# Patient Record
Sex: MALE | Race: WHITE | NOT HISPANIC OR LATINO | Employment: FULL TIME | URBAN - METROPOLITAN AREA
[De-identification: names, ages, dates, MRNs, and addresses within clinical notes are randomized per-mention and may not be internally consistent; named-entity substitution may affect disease eponyms.]

---

## 2024-02-01 ENCOUNTER — OFFICE VISIT (OUTPATIENT)
Dept: OTOLARYNGOLOGY | Facility: CLINIC | Age: 39
End: 2024-02-01
Payer: COMMERCIAL

## 2024-02-01 ENCOUNTER — APPOINTMENT (OUTPATIENT)
Dept: CARDIOLOGY | Facility: HOSPITAL | Age: 39
End: 2024-02-01
Payer: COMMERCIAL

## 2024-02-01 ENCOUNTER — APPOINTMENT (OUTPATIENT)
Dept: RADIOLOGY | Facility: HOSPITAL | Age: 39
End: 2024-02-01
Payer: COMMERCIAL

## 2024-02-01 ENCOUNTER — HOSPITAL ENCOUNTER (EMERGENCY)
Facility: HOSPITAL | Age: 39
Discharge: HOME | End: 2024-02-01
Attending: EMERGENCY MEDICINE
Payer: COMMERCIAL

## 2024-02-01 VITALS
HEART RATE: 90 BPM | HEIGHT: 71 IN | RESPIRATION RATE: 18 BRPM | TEMPERATURE: 97.3 F | WEIGHT: 290 LBS | SYSTOLIC BLOOD PRESSURE: 178 MMHG | DIASTOLIC BLOOD PRESSURE: 114 MMHG | BODY MASS INDEX: 40.6 KG/M2 | OXYGEN SATURATION: 98 %

## 2024-02-01 VITALS
TEMPERATURE: 97.3 F | SYSTOLIC BLOOD PRESSURE: 177 MMHG | WEIGHT: 293.8 LBS | HEIGHT: 71 IN | DIASTOLIC BLOOD PRESSURE: 127 MMHG | BODY MASS INDEX: 41.13 KG/M2

## 2024-02-01 DIAGNOSIS — R09.82 POSTNASAL DRIP: ICD-10-CM

## 2024-02-01 DIAGNOSIS — K21.9 LARYNGOPHARYNGEAL REFLUX (LPR): ICD-10-CM

## 2024-02-01 DIAGNOSIS — I10 HYPERTENSION, UNSPECIFIED TYPE: Primary | ICD-10-CM

## 2024-02-01 DIAGNOSIS — R49.0 HOARSENESS: Primary | ICD-10-CM

## 2024-02-01 DIAGNOSIS — J30.9 CHRONIC ALLERGIC RHINITIS: ICD-10-CM

## 2024-02-01 LAB
ALBUMIN SERPL BCP-MCNC: 4.8 G/DL (ref 3.4–5)
ALP SERPL-CCNC: 60 U/L (ref 33–120)
ALT SERPL W P-5'-P-CCNC: 72 U/L (ref 10–52)
ANION GAP SERPL CALC-SCNC: 12 MMOL/L (ref 10–20)
APPEARANCE UR: ABNORMAL
AST SERPL W P-5'-P-CCNC: 50 U/L (ref 9–39)
BASOPHILS # BLD AUTO: 0.07 X10*3/UL (ref 0–0.1)
BASOPHILS NFR BLD AUTO: 0.5 %
BILIRUB SERPL-MCNC: 0.3 MG/DL (ref 0–1.2)
BILIRUB UR STRIP.AUTO-MCNC: NEGATIVE MG/DL
BNP SERPL-MCNC: 10 PG/ML (ref 0–99)
BUN SERPL-MCNC: 19 MG/DL (ref 6–23)
CALCIUM SERPL-MCNC: 9.7 MG/DL (ref 8.6–10.3)
CARDIAC TROPONIN I PNL SERPL HS: 7 NG/L (ref 0–20)
CHLORIDE SERPL-SCNC: 102 MMOL/L (ref 98–107)
CO2 SERPL-SCNC: 28 MMOL/L (ref 21–32)
COLOR UR: YELLOW
CREAT SERPL-MCNC: 0.88 MG/DL (ref 0.5–1.3)
EGFRCR SERPLBLD CKD-EPI 2021: >90 ML/MIN/1.73M*2
EOSINOPHIL # BLD AUTO: 0.17 X10*3/UL (ref 0–0.7)
EOSINOPHIL NFR BLD AUTO: 1.3 %
ERYTHROCYTE [DISTWIDTH] IN BLOOD BY AUTOMATED COUNT: 12.9 % (ref 11.5–14.5)
GLUCOSE SERPL-MCNC: 90 MG/DL (ref 74–99)
GLUCOSE UR STRIP.AUTO-MCNC: NEGATIVE MG/DL
HCT VFR BLD AUTO: 49.9 % (ref 41–52)
HGB BLD-MCNC: 16.9 G/DL (ref 13.5–17.5)
HYALINE CASTS #/AREA URNS AUTO: ABNORMAL /LPF
IMM GRANULOCYTES # BLD AUTO: 0.04 X10*3/UL (ref 0–0.7)
IMM GRANULOCYTES NFR BLD AUTO: 0.3 % (ref 0–0.9)
KETONES UR STRIP.AUTO-MCNC: NEGATIVE MG/DL
LEUKOCYTE ESTERASE UR QL STRIP.AUTO: NEGATIVE
LYMPHOCYTES # BLD AUTO: 4.07 X10*3/UL (ref 1.2–4.8)
LYMPHOCYTES NFR BLD AUTO: 30.6 %
MAGNESIUM SERPL-MCNC: 2.17 MG/DL (ref 1.6–2.4)
MCH RBC QN AUTO: 29.6 PG (ref 26–34)
MCHC RBC AUTO-ENTMCNC: 33.9 G/DL (ref 32–36)
MCV RBC AUTO: 87 FL (ref 80–100)
MONOCYTES # BLD AUTO: 0.91 X10*3/UL (ref 0.1–1)
MONOCYTES NFR BLD AUTO: 6.8 %
NEUTROPHILS # BLD AUTO: 8.05 X10*3/UL (ref 1.2–7.7)
NEUTROPHILS NFR BLD AUTO: 60.5 %
NITRITE UR QL STRIP.AUTO: NEGATIVE
NRBC BLD-RTO: 0 /100 WBCS (ref 0–0)
PH UR STRIP.AUTO: 5 [PH]
PHOSPHATE SERPL-MCNC: 4.1 MG/DL (ref 2.5–4.9)
PLATELET # BLD AUTO: 327 X10*3/UL (ref 150–450)
POTASSIUM SERPL-SCNC: 4.3 MMOL/L (ref 3.5–5.3)
PROT SERPL-MCNC: 7.5 G/DL (ref 6.4–8.2)
PROT UR STRIP.AUTO-MCNC: ABNORMAL MG/DL
RBC # BLD AUTO: 5.71 X10*6/UL (ref 4.5–5.9)
RBC # UR STRIP.AUTO: NEGATIVE /UL
RBC #/AREA URNS AUTO: ABNORMAL /HPF
SODIUM SERPL-SCNC: 138 MMOL/L (ref 136–145)
SP GR UR STRIP.AUTO: 1.03
UROBILINOGEN UR STRIP.AUTO-MCNC: <2 MG/DL
WBC # BLD AUTO: 13.3 X10*3/UL (ref 4.4–11.3)
WBC #/AREA URNS AUTO: ABNORMAL /HPF

## 2024-02-01 PROCEDURE — 81001 URINALYSIS AUTO W/SCOPE: CPT

## 2024-02-01 PROCEDURE — 31575 DIAGNOSTIC LARYNGOSCOPY: CPT | Performed by: OTOLARYNGOLOGY

## 2024-02-01 PROCEDURE — 71045 X-RAY EXAM CHEST 1 VIEW: CPT | Performed by: RADIOLOGY

## 2024-02-01 PROCEDURE — 99204 OFFICE O/P NEW MOD 45 MIN: CPT | Performed by: OTOLARYNGOLOGY

## 2024-02-01 PROCEDURE — 99283 EMERGENCY DEPT VISIT LOW MDM: CPT | Mod: 25

## 2024-02-01 PROCEDURE — 85025 COMPLETE CBC W/AUTO DIFF WBC: CPT

## 2024-02-01 PROCEDURE — 4004F PT TOBACCO SCREEN RCVD TLK: CPT | Performed by: OTOLARYNGOLOGY

## 2024-02-01 PROCEDURE — 84100 ASSAY OF PHOSPHORUS: CPT

## 2024-02-01 PROCEDURE — 93005 ELECTROCARDIOGRAM TRACING: CPT

## 2024-02-01 PROCEDURE — 83880 ASSAY OF NATRIURETIC PEPTIDE: CPT

## 2024-02-01 PROCEDURE — 99284 EMERGENCY DEPT VISIT MOD MDM: CPT | Performed by: EMERGENCY MEDICINE

## 2024-02-01 PROCEDURE — 99285 EMERGENCY DEPT VISIT HI MDM: CPT | Performed by: EMERGENCY MEDICINE

## 2024-02-01 PROCEDURE — 84484 ASSAY OF TROPONIN QUANT: CPT

## 2024-02-01 PROCEDURE — 71045 X-RAY EXAM CHEST 1 VIEW: CPT

## 2024-02-01 PROCEDURE — 83735 ASSAY OF MAGNESIUM: CPT

## 2024-02-01 PROCEDURE — 80053 COMPREHEN METABOLIC PANEL: CPT

## 2024-02-01 PROCEDURE — 36415 COLL VENOUS BLD VENIPUNCTURE: CPT

## 2024-02-01 RX ORDER — OMEPRAZOLE 20 MG/1
CAPSULE, DELAYED RELEASE ORAL
Qty: 90 CAPSULE | Refills: 3 | Status: SHIPPED | OUTPATIENT
Start: 2024-02-01

## 2024-02-01 ASSESSMENT — LIFESTYLE VARIABLES
HAVE PEOPLE ANNOYED YOU BY CRITICIZING YOUR DRINKING: NO
HAVE YOU EVER FELT YOU SHOULD CUT DOWN ON YOUR DRINKING: NO
EVER HAD A DRINK FIRST THING IN THE MORNING TO STEADY YOUR NERVES TO GET RID OF A HANGOVER: NO
EVER FELT BAD OR GUILTY ABOUT YOUR DRINKING: NO

## 2024-02-01 ASSESSMENT — PAIN SCALES - GENERAL: PAINLEVEL_OUTOF10: 0 - NO PAIN

## 2024-02-01 ASSESSMENT — COLUMBIA-SUICIDE SEVERITY RATING SCALE - C-SSRS
2. HAVE YOU ACTUALLY HAD ANY THOUGHTS OF KILLING YOURSELF?: NO
6. HAVE YOU EVER DONE ANYTHING, STARTED TO DO ANYTHING, OR PREPARED TO DO ANYTHING TO END YOUR LIFE?: NO
1. IN THE PAST MONTH, HAVE YOU WISHED YOU WERE DEAD OR WISHED YOU COULD GO TO SLEEP AND NOT WAKE UP?: NO

## 2024-02-01 ASSESSMENT — PAIN - FUNCTIONAL ASSESSMENT: PAIN_FUNCTIONAL_ASSESSMENT: 0-10

## 2024-02-01 NOTE — PROGRESS NOTES
Impression:  1. Hoarseness        2. Laryngopharyngeal reflux (LPR)  omeprazole (PriLOSEC) 20 mg DR capsule      3. Chronic allergic rhinitis        4. Postnasal drip              RECOMMENDATIONS/PLAN :  We will start the patient on omeprazole 20 mg daily and I want him to continue to drink plenty of water and avoid excessive caffeine.  He will restart Flonase nasal spray-2 puffs each nostril daily to help prevent his postnasal drip.  He will also rinse his nose and sinuses using a sinus rinse kit.  I will see him back in the office over the next 4 months and recheck his vocal cords.  Due to his elevated blood pressure I have recommended that he go through the emergency room here at [facility] to be evaluated.  The patient does agree to this plan.      **This electronic medical record note was created with the use of voice recognition software.  Despite proofreading, typographical or grammatical errors may be present that could affect meaning of content **    Subjective   Patient ID:     Rolando Trotter is a 38 y.o. male who presents to the office today complaining of intermittent hoarseness as well as some postnasal drip.  He also complains of a fullness sensation in his throat with persistent throat clearing.  He denies any hemoptysis or hematemesis.  He denies choking on foods or liquids.  No fever or chills.  No chronic sinus infections.  He does have some allergies with postnasal drip.    ROS:  A detailed 12 system review of systems is noted on the intake form has been reviewed with the patient with details noted in the HPI and scanned into the patient's medical record.    Objective     History reviewed. No pertinent past medical history.     History reviewed. No pertinent surgical history.     No Known Allergies       Current Outpatient Medications:     omeprazole (PriLOSEC) 20 mg DR capsule, 20 mg by mouth daily, Disp: 90 capsule, Rfl: 3     Tobacco Use: High Risk (2/1/2024)    Patient History     Smoking Tobacco  "Use: Never     Smokeless Tobacco Use: Current     Passive Exposure: Not on file        Alcohol Use: Not on file        Social History     Substance and Sexual Activity   Drug Use Not on file        Physical Exam:  Visit Vitals  BP (!) 203/133   Temp 36.3 °C (97.3 °F) (Temporal)   Ht 1.803 m (5' 11\")   Wt 133 kg (293 lb 12.8 oz)   BMI 40.98 kg/m²   Smoking Status Never   BSA 2.58 m²      General: Patient is alert, oriented, cooperative in no apparent distress.  Head: Normocephalic, atraumatic.  Eyes: PERRL, EOMI, Conjunctiva is clear. No nystagmus.  Ears: Right Ear-- Pinna is normal.  External auditory canal is patent. Tympanic membrane is [intact, translucent and has good mobility with my pneumatic otoscope. No effusion].  Mastoid is nontender.  Left ear-- Pinna is normal.  External auditory canal is patent. Tympanic membrane is [intact, translucent and has good mobility with my pneumatic otoscope.  No effusion].  Mastoid is nontender.  Nose: Septum is deviated to the right.  No septal perforation or lesions. No septal hematoma/ seroma.  No signs of bleeding.  Inferior turbinates are mildly swollen.   No evidence of intranasal polyps.  No infectious drainage.  Throat:  Floor of mouth is clear, no masses.  Tongue appears normal, no lesions or masses. Gums, gingiva, buccal mucosa appear pink and moist, no lesions. Teeth are in good repair.  No obvious dental infections.  Peritonsillar regions appear symmetric without swelling.  Hard and soft palate appear normal, no obvious cleft. Uvula is midline.  Oropharynx: No lesions. Retropharyngeal wall is flat.  Minimal clear postnasal drip.  Neck: Supple,  no lymphadenopathy.  No masses.  Salivary Glands: Symmetric bilaterally.  No palpable masses.  No evidence of acute infection or salivary stones  Neurologic: Cranial Nerves 2-12 are grossly intact without focal deficits. Cerebellar function testing is normal.     Results:   []    Procedure:   Pre Op Diagnosis: Persistent " hoarseness-rule out vocal cord nodules  Post Op Diagnosis: Same  Procedure: Flexible Nasopharyngolaryngoscopy  Surgeon: Los Price DO  Assist: None  Anesthesia: Topical Lidocaine 4%/ 0.05% Afrin 1:1 mixture  EBL: None  Complications: None  Disposition: The patient tolerated the procedure well. There were no complications.    Procedure:  After informed consent was obtained with the risks, benefits, complications and alternatives explained to the patient/ guardian, the patient was sat up in the ENT chair and the nose was anesthetized and decongested with topical  4% lidocaine and 0.05% Afrin. After allowing this to take effect, the flexible nasopharyngoscope was advanced thru the nostrils and down to the larynx. The following areas were visualized:  The nasal passages, septum, nasopharynx, sinus ostia, base of tongue, epiglottis, true and false vocal folds, arytenoids, post cricoid region and subglottis were all examined and found to be normal except as follows:  Septum has a deviation to the right.  His ostiomeatal complexes are clear bilaterally.  No pus polyps or lesions.  Nasopharynx is clear.  Epiglottis is normal.  Base of tongue clear.  True cords are approximating equally bilaterally and they are somewhat irritated.  No nodules polyps or lesions.  Arytenoids show severe edema consistent with laryngopharyngeal reflux.  Subglottis is clear.      The patient tolerated the procedure well and there were no complications.      Los Price DO

## 2024-02-01 NOTE — ED PROVIDER NOTES
EMERGENCY DEPARTMENT ENCOUNTER      Pt Name: Rolando Trotter  MRN: [id number]  Birthdate 1985  Date of evaluation: 2/1/2024  Provider: Mario Luque MD    CHIEF COMPLAINT       Chief Complaint   Patient presents with    Hypertension     ENT sent here for high BP readings at office.            HISTORY OF PRESENT ILLNESS    HPI  38-year-old male presents emergency room with chief complaint high blood pressure.  Patient was at his ear nose and throat doctor appointment for normal evaluation they indicated that his blood pressure was high initial reading was 203/133 the next reading was 177/127 followed by an in the emergency room reading in the room 185/117.  The patient has no other complaints denies feeling unwell.  He also indicates he has no PCP to follow-up with.  Nursing Notes were reviewed.    PAST MEDICAL HISTORY   History reviewed. No pertinent past medical history.      SURGICAL HISTORY     History reviewed. No pertinent surgical history.      CURRENT MEDICATIONS       Previous Medications    OMEPRAZOLE (PRILOSEC) 20 MG DR CAPSULE    20 mg by mouth daily       ALLERGIES     Patient has no known allergies.    FAMILY HISTORY     No family history on file.       SOCIAL HISTORY       Social History     Socioeconomic History    Marital status: Single     Spouse name: None    Number of children: None    Years of education: None    Highest education level: None   Occupational History    None   Tobacco Use    Smoking status: Never    Smokeless tobacco: Current     Types: Chew   Substance and Sexual Activity    Alcohol use: Yes     Comment: occasionally    Drug use: Never    Sexual activity: None   Other Topics Concern    None   Social History Narrative    None     Social Determinants of Health     Financial Resource Strain: Not on file   Food Insecurity: Not on file   Transportation Needs: Not on file   Physical Activity: Not on file   Stress: Not on file   Social Connections: Not on file   Intimate Partner Violence:  Not on file   Housing Stability: Not on file       SCREENINGS                        PHYSICAL EXAM    (up to 7 for level 4, 8 or more for level 5)     ED Triage Vitals [02/01/24 1427]   Temperature Heart Rate Respirations BP   36.3 °C (97.3 °F) 95 18 159/90      Pulse Ox Temp Source Heart Rate Source Patient Position   98 % Temporal Monitor Sitting      BP Location FiO2 (%)     Right arm --       Physical Exam  Vitals and nursing note reviewed.   Constitutional:       General: He is not in acute distress.     Appearance: He is well-developed.   HENT:      Head: Normocephalic and atraumatic.   Eyes:      Conjunctiva/sclera: Conjunctivae normal.   Cardiovascular:      Rate and Rhythm: Normal rate and regular rhythm.      Heart sounds: No murmur heard.  Pulmonary:      Effort: Pulmonary effort is normal. No respiratory distress.      Breath sounds: Normal breath sounds.   Abdominal:      Palpations: Abdomen is soft.      Tenderness: There is no abdominal tenderness.   Musculoskeletal:         General: No swelling.      Cervical back: Neck supple.   Skin:     General: Skin is warm and dry.      Capillary Refill: Capillary refill takes less than 2 seconds.   Neurological:      Mental Status: He is alert.   Psychiatric:         Mood and Affect: Mood normal.          DIAGNOSTIC RESULTS     LABS:  Labs Reviewed - No data to display    All other labs were within normal range or not returned as of this dictation.    Imaging  No orders to display        Procedures  Procedures     EMERGENCY DEPARTMENT COURSE/MDM:         Medical Decision Making  38-year-old male presents emergency room with chief complaint of high blood pressure.  Medical management treatment emergency room will consist of cardiovascular workup including CBC, CMP, EKG, chest x-ray, troponin, BMP.  We will be providing the patient with for follow-up.  Lab work showed no acute findings for any endorgan damage.  Urinalysis showed protein and hyaline casts.   However kidney function is intact.  Patient will be informed and discharged home with a primary care provider.    =================Attending note===============    The patient was seen by the resident/fellow.  I have personally performed a substantive portion of the encounter.  I have seen and examined the patient; agree with the workup, evaluation, MDM,   management and diagnosis.  The care plan has been discussed with the resident; I have reviewed the resident's note and agree with the documented findings.      This is a 38 y.o. male who presents to ER with asymptomatic hypertension.  He was at the ENT office today and his blood pressure was 203/133.  States he repeated it and it was 177/127.  He has no symptoms of hypertension.  There is no headache or chest pain.  No shortness of breath or dizziness.  No urinary issues.  He was only there because of a hoarse voice that he had for weeks.  He states he was told he had silent reflux.  On arrival here he had a pressure of 185/113.  When I saw the patient I repeated his blood pressure and it was 162/113 without any intervention.  He states about a year ago he had his blood pressure checked and it was slightly elevated.    Heart is regular.  Lungs are clear.  Abdomen soft and nontender.  No distress.    EKG: Normal sinus rhythm with a ventricular rate of 84.  Parable 164.  QTc 432.  No ST elevations.    He did have a reading of 152/90 here also.    Normal renal function.  Mild elevation of AST and ALT.  White count slightly elevated at 13.  No anemia.  Chest x-ray is no acute findings.    Patient is discharged home.  He is to follow-up as elevated blood pressure with primary care.  He is to return to the nearest ER for any new or worsening symptoms.  He is satisfied this plan.            ==========================================          Patient and or family in agreement and understanding of treatment plan.  All questions answered.      I reviewed the case with the  attending ED physician. The attending ED physician agrees with the plan. Patient and/or patient´s representative was counseled regarding labs, imaging, likely diagnosis, and plan. All questions were answered.    ED Medications administered this visit:  Medications - No data to display    New Prescriptions from this visit:    New Prescriptions    No medications on file       Follow-up:  No follow-up provider specified.      Final Impression: No diagnosis found.      (Please note that portions of this note were completed with a voice recognition program.  Efforts were made to edit the dictations but occasionally words are mis-transcribed.)     Mario Luque MD  Resident  02/01/24 2101

## 2024-02-01 NOTE — DISCHARGE INSTRUCTIONS
Seek immediate medical attention if you develop: headache, chest pain, nausea, vomiting, weakness, numbness, tingling, excessive sweating, shortness of breath, difficulty breathing, loss of motion in your arms or legs, or any new or worsening symptoms.    Follow up your elevated blood pressure with a primary care doctor.    Follow up your mildly elevated liver blood tests with your doctor.    Follow up all of your labs and imaging results with your doctor.

## 2024-02-02 LAB — HOLD SPECIMEN: NORMAL

## 2024-02-06 ENCOUNTER — OFFICE VISIT (OUTPATIENT)
Dept: PRIMARY CARE | Facility: CLINIC | Age: 39
End: 2024-02-06
Payer: COMMERCIAL

## 2024-02-06 VITALS
SYSTOLIC BLOOD PRESSURE: 178 MMHG | DIASTOLIC BLOOD PRESSURE: 105 MMHG | HEIGHT: 71 IN | WEIGHT: 297.4 LBS | HEART RATE: 92 BPM | BODY MASS INDEX: 41.64 KG/M2

## 2024-02-06 DIAGNOSIS — I10 PRIMARY HYPERTENSION: Primary | ICD-10-CM

## 2024-02-06 DIAGNOSIS — R53.83 OTHER FATIGUE: ICD-10-CM

## 2024-02-06 PROCEDURE — 3080F DIAST BP >= 90 MM HG: CPT | Performed by: INTERNAL MEDICINE

## 2024-02-06 PROCEDURE — 99204 OFFICE O/P NEW MOD 45 MIN: CPT | Performed by: INTERNAL MEDICINE

## 2024-02-06 PROCEDURE — 3077F SYST BP >= 140 MM HG: CPT | Performed by: INTERNAL MEDICINE

## 2024-02-06 PROCEDURE — 4004F PT TOBACCO SCREEN RCVD TLK: CPT | Performed by: INTERNAL MEDICINE

## 2024-02-06 RX ORDER — AMLODIPINE BESYLATE 5 MG/1
5 TABLET ORAL DAILY
Qty: 30 TABLET | Refills: 5 | Status: SHIPPED | OUTPATIENT
Start: 2024-02-06 | End: 2024-03-06 | Stop reason: SDUPTHER

## 2024-02-06 ASSESSMENT — SLEEP AND FATIGUE QUESTIONNAIRES
HOW LIKELY ARE YOU TO NOD OFF OR FALL ASLEEP WHILE SITTING QUIETLY AFTER LUNCH WITHOUT ALCOHOL: MODERATE CHANCE OF DOZING
HOW LIKELY ARE YOU TO NOD OFF OR FALL ASLEEP WHILE SITTING AND TALKING TO SOMEONE: WOULD NEVER DOZE
HOW LIKELY ARE YOU TO NOD OFF OR FALL ASLEEP WHILE SITTING AND READING: MODERATE CHANCE OF DOZING
HOW LIKELY ARE YOU TO NOD OFF OR FALL ASLEEP WHEN YOU ARE A PASSENGER IN A CAR FOR AN HOUR WITHOUT A BREAK: WOULD NEVER DOZE
HOW LIKELY ARE YOU TO NOD OFF OR FALL ASLEEP IN A CAR, WHILE STOPPED FOR A FEW MINUTES IN TRAFFIC: WOULD NEVER DOZE
HOW LIKELY ARE YOU TO NOD OFF OR FALL ASLEEP WHILE WATCHING TV: SLIGHT CHANCE OF DOZING
ESS TOTAL SCORE: 10
HOW LIKELY ARE YOU TO NOD OFF OR FALL ASLEEP WHILE SITTING INACTIVE IN A PUBLIC PLACE: MODERATE CHANCE OF DOZING
HOW LIKELY ARE YOU TO NOD OFF OR FALL ASLEEP WHILE LYING DOWN TO REST IN THE AFTERNOON WHEN CIRCUMSTANCES PERMIT: HIGH CHANCE OF DOZING

## 2024-02-06 ASSESSMENT — ENCOUNTER SYMPTOMS
SLEEP DISTURBANCE: 1
FATIGUE: 1

## 2024-02-06 NOTE — ASSESSMENT & PLAN NOTE
-BP persistently elevated at multiple appts.  -Reviewed possible risk factors such as diet, weight, possible sleep apnea.  -Will start amlodipine 5mg daily. Reviewed side effects of medication and for low blood pressure. Pt to call office should that occur.  -Will bring back in a month to follow up. Pt agreeable with plan.

## 2024-02-06 NOTE — PROGRESS NOTES
"Subjective   Patient ID: Rolando Trotter is a 38 y.o. male who presents for Establish Care (Pt states he really need his blood pressure controlled).    Here because of elevated BP.    Was first told it was elevated a year ago. Was seen by ENT recently, then in the ER for it. Not started on any medication. Doesn't smoke and will rarely drink alcohol. For caffeine has around 2 cans of soda a day. Doesn't normally eat breakfast. For lunch will pack a sandwich with cold cuts, have fruits, and have a bag of chips. For dinner will almost always make something for himself.     Notes that he doesn't feel well rested when he wakes up. Finds it hard to fall asleep. Can doze off easily. Isn't sure if he snores or stops breathing in his sleep.        Review of Systems   Constitutional:  Positive for fatigue.   Psychiatric/Behavioral:  Positive for sleep disturbance.        BP (!) 178/105 (BP Location: Right arm, Patient Position: Sitting, BP Cuff Size: Adult)   Pulse 92   Ht 1.795 m (5' 10.67\")   Wt 135 kg (297 lb 6.4 oz)   BMI 41.87 kg/m²   Objective   Physical Exam  Constitutional:       General: He is not in acute distress.     Appearance: He is obese. He is not ill-appearing, toxic-appearing or diaphoretic.   HENT:      Head: Normocephalic and atraumatic.   Eyes:      General: No scleral icterus.     Conjunctiva/sclera: Conjunctivae normal.   Cardiovascular:      Rate and Rhythm: Normal rate and regular rhythm.      Heart sounds: No murmur heard.     No friction rub. No gallop.   Pulmonary:      Effort: Pulmonary effort is normal. No respiratory distress.      Breath sounds: No stridor. No wheezing, rhonchi or rales.   Abdominal:      General: Abdomen is flat. Bowel sounds are normal. There is no distension.      Palpations: Abdomen is soft.      Tenderness: There is no abdominal tenderness. There is no guarding.   Musculoskeletal:      Cervical back: Normal range of motion and neck supple. No tenderness.      Right lower " leg: No edema.      Left lower leg: No edema.   Lymphadenopathy:      Cervical: No cervical adenopathy.   Skin:     General: Skin is warm and dry.   Neurological:      Mental Status: He is alert.         Assessment/Plan   Problem List Items Addressed This Visit             ICD-10-CM    Primary hypertension - Primary I10     -BP persistently elevated at multiple appts.  -Reviewed possible risk factors such as diet, weight, possible sleep apnea.  -Will start amlodipine 5mg daily. Reviewed side effects of medication and for low blood pressure. Pt to call office should that occur.  -Will bring back in a month to follow up. Pt agreeable with plan.         Relevant Medications    amLODIPine (Norvasc) 5 mg tablet    Other Relevant Orders    Lipid panel    Other fatigue R53.83     History concerning for possible sleep apnea. STOP BANG of 4-5 (pt also unsure if he snores or stops breathing at night).  -Pt agreeable to home sleep test. Office helping arrange.                 Gisella Woods MD 02/06/24 11:01 AM

## 2024-02-06 NOTE — ASSESSMENT & PLAN NOTE
History concerning for possible sleep apnea. STOP BANG of 4-5 (pt also unsure if he snores or stops breathing at night).  -Pt agreeable to home sleep test. Office helping arrange.

## 2024-02-28 LAB
ATRIAL RATE: 84 BPM
P AXIS: 15 DEGREES
P OFFSET: 186 MS
P ONSET: 134 MS
PR INTERVAL: 164 MS
Q ONSET: 216 MS
QRS COUNT: 14 BEATS
QRS DURATION: 86 MS
QT INTERVAL: 366 MS
QTC CALCULATION(BAZETT): 432 MS
QTC FREDERICIA: 409 MS
R AXIS: 45 DEGREES
T AXIS: 61 DEGREES
T OFFSET: 399 MS
VENTRICULAR RATE: 84 BPM

## 2024-03-06 ENCOUNTER — OFFICE VISIT (OUTPATIENT)
Dept: PRIMARY CARE | Facility: CLINIC | Age: 39
End: 2024-03-06
Payer: COMMERCIAL

## 2024-03-06 VITALS
HEART RATE: 86 BPM | DIASTOLIC BLOOD PRESSURE: 94 MMHG | SYSTOLIC BLOOD PRESSURE: 150 MMHG | BODY MASS INDEX: 43.02 KG/M2 | WEIGHT: 305.6 LBS

## 2024-03-06 DIAGNOSIS — I10 PRIMARY HYPERTENSION: Primary | ICD-10-CM

## 2024-03-06 PROCEDURE — 4004F PT TOBACCO SCREEN RCVD TLK: CPT | Performed by: INTERNAL MEDICINE

## 2024-03-06 PROCEDURE — 99213 OFFICE O/P EST LOW 20 MIN: CPT | Performed by: INTERNAL MEDICINE

## 2024-03-06 PROCEDURE — 3077F SYST BP >= 140 MM HG: CPT | Performed by: INTERNAL MEDICINE

## 2024-03-06 PROCEDURE — 3080F DIAST BP >= 90 MM HG: CPT | Performed by: INTERNAL MEDICINE

## 2024-03-06 RX ORDER — AMLODIPINE BESYLATE 10 MG/1
10 TABLET ORAL DAILY
Qty: 30 TABLET | Refills: 3 | Status: SHIPPED | OUTPATIENT
Start: 2024-03-06

## 2024-03-06 ASSESSMENT — ENCOUNTER SYMPTOMS
LIGHT-HEADEDNESS: 0
DIZZINESS: 0

## 2024-03-06 ASSESSMENT — PATIENT HEALTH QUESTIONNAIRE - PHQ9
1. LITTLE INTEREST OR PLEASURE IN DOING THINGS: NOT AT ALL
2. FEELING DOWN, DEPRESSED OR HOPELESS: NOT AT ALL
SUM OF ALL RESPONSES TO PHQ9 QUESTIONS 1 AND 2: 0

## 2024-03-06 NOTE — PROGRESS NOTES
Subjective   Patient ID: Rolando Trotter is a 38 y.o. male who presents for Follow-up.    Here for BP follow up. Has been taking amlodipine daily w/o issue.  Thinks he has some minimal lower extremity edema since starting it.  Denies dizziness or lightheadedness.  Has been trying to lose weight since last appointment, and has cut usage down to 1-2 drinks a week, as opposed to 24 drinks a week he was doing previously.        Review of Systems   Cardiovascular:  Positive for leg swelling.   Neurological:  Negative for dizziness and light-headedness.       BP (!) 150/94 (BP Location: Right arm, Patient Position: Sitting, BP Cuff Size: Adult)   Pulse 86   Wt 139 kg (305 lb 9.6 oz)   BMI 43.02 kg/m²   Objective   Physical Exam  Constitutional:       General: He is not in acute distress.     Appearance: He is obese. He is not ill-appearing, toxic-appearing or diaphoretic.   HENT:      Head: Normocephalic and atraumatic.   Neurological:      Mental Status: He is alert.         Assessment/Plan   Problem List Items Addressed This Visit             ICD-10-CM    Primary hypertension - Primary I10    Relevant Medications    amLODIPine (Norvasc) 10 mg tablet   -Increased amlodipine to 10 mg daily from 5 mg.  Will bring back in 1 month for nurse visit for blood pressure check.  Patient agreeable with plan.  -Provided pamphlet on DASH diet.  -Counseled on weight loss strategies including diet, exercise, calorie counting, medication assistance.  Patient indicates he might be interested in medications if he is unable to lose weight through diet and exercise.  Will follow-up at next appointment.         Gisella Woods MD 03/06/24 10:21 AM

## 2024-04-09 ENCOUNTER — CLINICAL SUPPORT (OUTPATIENT)
Dept: PRIMARY CARE | Facility: CLINIC | Age: 39
End: 2024-04-09
Payer: COMMERCIAL

## 2024-04-09 VITALS — DIASTOLIC BLOOD PRESSURE: 89 MMHG | SYSTOLIC BLOOD PRESSURE: 152 MMHG

## 2024-04-09 DIAGNOSIS — I10 PRIMARY HYPERTENSION: Primary | ICD-10-CM

## 2024-04-09 RX ORDER — LISINOPRIL 10 MG/1
10 TABLET ORAL DAILY
Qty: 30 TABLET | Refills: 1 | Status: SHIPPED | OUTPATIENT
Start: 2024-04-09 | End: 2024-05-06 | Stop reason: SDUPTHER

## 2024-04-09 NOTE — PROGRESS NOTES
Pt denies dizziness/lightheadedness. Tolerating BP med w/o issue. BP elevated but improved today compared to last appt. Discussed lisinopril w/ pt and he is agreeable with starting. Reviewed possible side effects. Will get BMP before next appt in 2-4 weeks. At that time will see if dose needs adjusted and will discuss weight loss drugs. Pt agreeable with plan.    Reviewed and approved by LESA HODGSON on 4/9/24 at 10:46 AM.

## 2024-05-06 ENCOUNTER — OFFICE VISIT (OUTPATIENT)
Dept: PRIMARY CARE | Facility: CLINIC | Age: 39
End: 2024-05-06
Payer: COMMERCIAL

## 2024-05-06 VITALS
SYSTOLIC BLOOD PRESSURE: 148 MMHG | DIASTOLIC BLOOD PRESSURE: 88 MMHG | WEIGHT: 296.4 LBS | HEART RATE: 78 BPM | BODY MASS INDEX: 41.73 KG/M2

## 2024-05-06 DIAGNOSIS — I10 PRIMARY HYPERTENSION: Primary | ICD-10-CM

## 2024-05-06 DIAGNOSIS — E66.01 CLASS 3 SEVERE OBESITY DUE TO EXCESS CALORIES WITH SERIOUS COMORBIDITY AND BODY MASS INDEX (BMI) OF 40.0 TO 44.9 IN ADULT (MULTI): ICD-10-CM

## 2024-05-06 PROBLEM — E66.813 CLASS 3 SEVERE OBESITY DUE TO EXCESS CALORIES WITH SERIOUS COMORBIDITY AND BODY MASS INDEX (BMI) OF 40.0 TO 44.9 IN ADULT: Status: ACTIVE | Noted: 2024-05-06

## 2024-05-06 PROCEDURE — 3079F DIAST BP 80-89 MM HG: CPT | Performed by: INTERNAL MEDICINE

## 2024-05-06 PROCEDURE — 3008F BODY MASS INDEX DOCD: CPT | Performed by: INTERNAL MEDICINE

## 2024-05-06 PROCEDURE — 3077F SYST BP >= 140 MM HG: CPT | Performed by: INTERNAL MEDICINE

## 2024-05-06 PROCEDURE — 99213 OFFICE O/P EST LOW 20 MIN: CPT | Performed by: INTERNAL MEDICINE

## 2024-05-06 RX ORDER — SEMAGLUTIDE 0.25 MG/.5ML
0.25 INJECTION, SOLUTION SUBCUTANEOUS
Refills: 0 | OUTPATIENT
Start: 2024-05-12 | End: 2024-06-03

## 2024-05-06 RX ORDER — LISINOPRIL 20 MG/1
20 TABLET ORAL DAILY
Qty: 30 TABLET | Refills: 1 | Status: SHIPPED | OUTPATIENT
Start: 2024-05-06

## 2024-05-06 RX ORDER — SEMAGLUTIDE 0.25 MG/.5ML
0.25 INJECTION, SOLUTION SUBCUTANEOUS
Qty: 2 ML | Refills: 0 | Status: SHIPPED | OUTPATIENT
Start: 2024-05-06 | End: 2024-06-03

## 2024-05-06 RX ORDER — SEMAGLUTIDE 0.25 MG/.5ML
0.25 INJECTION, SOLUTION SUBCUTANEOUS
Qty: 2 ML | Refills: 0 | Status: SHIPPED | OUTPATIENT
Start: 2024-05-12 | End: 2024-05-06

## 2024-05-06 ASSESSMENT — ENCOUNTER SYMPTOMS
LIGHT-HEADEDNESS: 0
DIZZINESS: 0

## 2024-05-06 NOTE — ASSESSMENT & PLAN NOTE
-Discussed diet and exercise.  -Reviewed GLP-1 use, side effects, and what to watch out for. Unclear if insurance will cover, but will send med in in the meantime. If not, reviewed w/ pt that only other medication we can consider is contrave. Will discuss at next appt.

## 2024-05-06 NOTE — ASSESSMENT & PLAN NOTE
-BP control mildly improved. On amlodipine 10mg daily and lisinopril 10mg daily. Will increased lisinopril dose up to 20mg daily, then have pt back in under a month to follow up.

## 2024-06-03 ENCOUNTER — APPOINTMENT (OUTPATIENT)
Dept: OTOLARYNGOLOGY | Facility: CLINIC | Age: 39
End: 2024-06-03
Payer: COMMERCIAL

## 2024-09-06 ENCOUNTER — APPOINTMENT (OUTPATIENT)
Dept: PRIMARY CARE | Facility: CLINIC | Age: 39
End: 2024-09-06
Payer: COMMERCIAL

## 2024-11-05 ENCOUNTER — OFFICE VISIT (OUTPATIENT)
Dept: URGENT CARE | Age: 39
End: 2024-11-05
Payer: COMMERCIAL

## 2024-11-05 ENCOUNTER — HOSPITAL ENCOUNTER (OUTPATIENT)
Dept: RADIOLOGY | Facility: CLINIC | Age: 39
Discharge: HOME | End: 2024-11-05
Payer: COMMERCIAL

## 2024-11-05 VITALS
BODY MASS INDEX: 38.71 KG/M2 | WEIGHT: 275 LBS | SYSTOLIC BLOOD PRESSURE: 144 MMHG | OXYGEN SATURATION: 97 % | HEART RATE: 119 BPM | RESPIRATION RATE: 16 BRPM | TEMPERATURE: 101.5 F | DIASTOLIC BLOOD PRESSURE: 79 MMHG

## 2024-11-05 DIAGNOSIS — J06.9 ACUTE RESPIRATORY DISEASE: ICD-10-CM

## 2024-11-05 DIAGNOSIS — R05.1 ACUTE COUGH: ICD-10-CM

## 2024-11-05 DIAGNOSIS — J06.9 UPPER RESPIRATORY TRACT INFECTION, UNSPECIFIED TYPE: Primary | ICD-10-CM

## 2024-11-05 DIAGNOSIS — J06.9 UPPER RESPIRATORY TRACT INFECTION, UNSPECIFIED TYPE: ICD-10-CM

## 2024-11-05 LAB
POC RAPID INFLUENZA A: NEGATIVE
POC RAPID INFLUENZA B: NEGATIVE
POC SARS-COV-2 AG BINAX: NORMAL

## 2024-11-05 PROCEDURE — 3077F SYST BP >= 140 MM HG: CPT | Performed by: STUDENT IN AN ORGANIZED HEALTH CARE EDUCATION/TRAINING PROGRAM

## 2024-11-05 PROCEDURE — 71046 X-RAY EXAM CHEST 2 VIEWS: CPT

## 2024-11-05 PROCEDURE — 3078F DIAST BP <80 MM HG: CPT | Performed by: STUDENT IN AN ORGANIZED HEALTH CARE EDUCATION/TRAINING PROGRAM

## 2024-11-05 PROCEDURE — 71046 X-RAY EXAM CHEST 2 VIEWS: CPT | Performed by: RADIOLOGY

## 2024-11-05 PROCEDURE — 4004F PT TOBACCO SCREEN RCVD TLK: CPT | Performed by: STUDENT IN AN ORGANIZED HEALTH CARE EDUCATION/TRAINING PROGRAM

## 2024-11-05 PROCEDURE — 87811 SARS-COV-2 COVID19 W/OPTIC: CPT | Performed by: STUDENT IN AN ORGANIZED HEALTH CARE EDUCATION/TRAINING PROGRAM

## 2024-11-05 PROCEDURE — 87804 INFLUENZA ASSAY W/OPTIC: CPT | Performed by: STUDENT IN AN ORGANIZED HEALTH CARE EDUCATION/TRAINING PROGRAM

## 2024-11-05 PROCEDURE — 99214 OFFICE O/P EST MOD 30 MIN: CPT | Performed by: STUDENT IN AN ORGANIZED HEALTH CARE EDUCATION/TRAINING PROGRAM

## 2024-11-05 RX ORDER — ONDANSETRON 4 MG/1
4 TABLET, ORALLY DISINTEGRATING ORAL ONCE
Status: COMPLETED | OUTPATIENT
Start: 2024-11-05 | End: 2024-11-05

## 2024-11-05 RX ORDER — IBUPROFEN 800 MG/1
800 TABLET ORAL ONCE
Status: COMPLETED | OUTPATIENT
Start: 2024-11-05 | End: 2024-11-05

## 2024-11-05 RX ORDER — BENZONATATE 100 MG/1
200 CAPSULE ORAL EVERY 8 HOURS
Qty: 60 CAPSULE | Refills: 0 | Status: SHIPPED | OUTPATIENT
Start: 2024-11-05 | End: 2024-11-15

## 2024-11-05 RX ORDER — ONDANSETRON 4 MG/1
4 TABLET, FILM COATED ORAL EVERY 8 HOURS PRN
Qty: 21 TABLET | Refills: 0 | Status: SHIPPED | OUTPATIENT
Start: 2024-11-05 | End: 2024-11-09 | Stop reason: HOSPADM

## 2024-11-05 RX ORDER — DOXYCYCLINE 100 MG/1
100 CAPSULE ORAL 2 TIMES DAILY
Qty: 20 CAPSULE | Refills: 0 | Status: SHIPPED | OUTPATIENT
Start: 2024-11-05 | End: 2024-11-09 | Stop reason: HOSPADM

## 2024-11-05 ASSESSMENT — ENCOUNTER SYMPTOMS
SHORTNESS OF BREATH: 0
DIARRHEA: 1
FEVER: 1
VOMITING: 1
NAUSEA: 1
FATIGUE: 1
STRIDOR: 0
MYALGIAS: 1
WHEEZING: 0
CARDIOVASCULAR NEGATIVE: 1
COUGH: 1

## 2024-11-05 ASSESSMENT — PAIN SCALES - GENERAL: PAINLEVEL_OUTOF10: 8

## 2024-11-05 NOTE — PROGRESS NOTES
Subjective   Patient ID: Rolando Trotter is a 39 y.o. male. They present today with a chief complaint of Cough (Fever, chills, body aches, diarrhea, cough X 5 days. Negative covid test at home. ).    History of Present Illness    Cough  Associated symptoms include a fever and myalgias. Pertinent negatives include no shortness of breath or wheezing.     Patient presents to urgent care for a chief complaint of cough fevers body aches and diarrhea over the last 5 days.  Has tried Tylenol and Motrin also has taken NyQuil to no resolve prompting visit to the urgent care.  Patient states that he did vomit last night after eating, no report of respiratory distress ,patient denies history of asthma bronchitis or pneumonia  Past Medical History  Allergies as of 11/05/2024    (No Known Allergies)       (Not in a hospital admission)       History reviewed. No pertinent past medical history.    History reviewed. No pertinent surgical history.     reports that he has never smoked. His smokeless tobacco use includes chew. He reports current alcohol use. He reports that he does not use drugs.    Review of Systems  Review of Systems   Constitutional:  Positive for fatigue and fever.   HENT: Negative.     Respiratory:  Positive for cough. Negative for shortness of breath, wheezing and stridor.    Cardiovascular: Negative.    Gastrointestinal:  Positive for diarrhea, nausea and vomiting.   Musculoskeletal:  Positive for myalgias.                                  Objective    Vitals:    11/05/24 1044   BP: 144/79   Pulse: (!) 119   Resp: 16   Temp: (!) 38.6 °C (101.5 °F)   SpO2: 97%   Weight: 125 kg (275 lb)     No LMP for male patient.    Physical Exam  Vitals and nursing note reviewed.   Constitutional:       General: He is not in acute distress.     Appearance: Normal appearance. He is ill-appearing. He is not toxic-appearing or diaphoretic.   HENT:      Head: Normocephalic and atraumatic.      Right Ear: Tympanic membrane normal.  There is no impacted cerumen.      Left Ear: Tympanic membrane normal. There is no impacted cerumen.      Nose: Nose normal.      Mouth/Throat:      Mouth: Mucous membranes are moist.      Pharynx: No oropharyngeal exudate.   Cardiovascular:      Rate and Rhythm: Normal rate and regular rhythm.      Pulses: Normal pulses.      Heart sounds: Normal heart sounds.   Pulmonary:      Effort: Pulmonary effort is normal. No respiratory distress.      Breath sounds: Normal breath sounds. No stridor. No wheezing, rhonchi or rales.      Comments: Several episodes of cough during exam  Abdominal:      General: Abdomen is flat. Bowel sounds are normal.      Palpations: Abdomen is soft.      Tenderness: There is no abdominal tenderness. There is no guarding or rebound.   Musculoskeletal:      Cervical back: Normal range of motion and neck supple.   Lymphadenopathy:      Cervical: No cervical adenopathy.   Skin:     General: Skin is warm.      Findings: Rash present. No lesion.   Neurological:      General: No focal deficit present.      Mental Status: He is alert and oriented to person, place, and time.   Psychiatric:         Mood and Affect: Mood normal.         Behavior: Behavior normal.         Procedures    Point of Care Test & Imaging Results from this visit  Results for orders placed or performed in visit on 11/05/24   POCT Influenza A/B manually resulted   Result Value Ref Range    POC Rapid Influenza A Negative Negative    POC Rapid Influenza B Negative Negative   POCT Covid-19 Rapid Antigen   Result Value Ref Range    POC JOHANA-COV-2 AG  Presumptive negative test for SARS-CoV-2 (no antigen detected)     Presumptive negative test for SARS-CoV-2 (no antigen detected)      XR chest 2 views    Result Date: 11/5/2024  Interpreted By:  Chris Uribe, STUDY: XR CHEST 2 VIEWS  11/5/2024 11:32 am   INDICATION: Signs/Symptoms:cough/fever  pneumonia rule out   COMPARISON: 02/01/2024   ACCESSION NUMBER(S): [id number]   ORDERING  CLINICIAN: LEIGHTON TREJO   TECHNIQUE: PA and lateral views of the chest were obtained.   FINDINGS: No focal infiltrate, pleural effusion or pneumothorax is identified. The cardiac silhouette is within normal limits for size.       No focal infiltrate or pneumothorax.   MACRO: None.   Signed by: Chris Uribe 11/5/2024 12:15 PM Dictation workstation:   [id number]   -As patient COVID and flu rapid test negative in office due to patient current fever, cough and report of diarrhea will obtain chest x-ray to rule out etiologies such as pneumonia  -Upon examination of chest x-ray I do not appreciate any focal consolidation pneumothorax or pleural effusion awaiting radiology interpretation at time of dictation  Diagnostic study results (if any) were reviewed by Leighton Trejo PA-C.  -Radiology impression no focal infiltrate or pneumothorax  Assessment/Plan   Allergies, medications, history, and pertinent labs/EKGs/Imaging reviewed by Leighton Trejo PA-C.     Medical Decision Making  I did discuss with patient radiology findings but as patient does present with fever vomiting diarrhea and cough we will cover for pneumonia with doxycycline will also send Zofran to help with nausea and Tessalon Perles for cough.  If any signs of respiratory distress or worsening of symptoms patient is to go to the emergency room or call 911.  Patient discharge emergent care A+O x 4 stable condition no signs of respiratory distress    Orders and Diagnoses  Diagnoses and all orders for this visit:  Upper respiratory tract infection, unspecified type  -     POCT Covid-19 Rapid Antigen  -     XR chest 2 views; Future  -     ondansetron ODT (Zofran-ODT) disintegrating tablet 4 mg  -     ibuprofen tablet 800 mg  -     doxycycline (Vibramycin) 100 mg capsule; Take 1 capsule (100 mg) by mouth 2 times a day for 10 days. Take with at least 8 ounces (large glass) of water, do not lie down for 30 minutes after  -     ondansetron (Zofran) 4 mg  tablet; Take 1 tablet (4 mg) by mouth every 8 hours if needed for nausea or vomiting for up to 7 days.  -     benzonatate (Tessalon Perles) 100 mg capsule; Take 2 capsules (200 mg) by mouth every 8 hours for 10 days. Do not crush or chew.  Acute cough  -     POCT Influenza A/B manually resulted  -     POCT Covid-19 Rapid Antigen  Acute respiratory disease  -     benzonatate (Tessalon Perles) 100 mg capsule; Take 2 capsules (200 mg) by mouth every 8 hours for 10 days. Do not crush or chew.      Medical Admin Record  Administrations This Visit       ibuprofen tablet 800 mg       Admin Date  11/05/2024 Action  Given Dose  800 mg Route  oral Documented By  Angy Boo MA              ondansetron ODT (Zofran-ODT) disintegrating tablet 4 mg       Admin Date  11/05/2024 Action  Given Dose  4 mg Route  oral Documented By  Angy Boo MA                    Patient disposition: Home    Electronically signed by Leighton Villafana PA-C  12:19 PM

## 2024-11-07 ENCOUNTER — APPOINTMENT (OUTPATIENT)
Dept: RADIOLOGY | Facility: HOSPITAL | Age: 39
End: 2024-11-07
Payer: COMMERCIAL

## 2024-11-07 ENCOUNTER — HOSPITAL ENCOUNTER (INPATIENT)
Facility: HOSPITAL | Age: 39
LOS: 2 days | Discharge: HOME | End: 2024-11-09
Attending: EMERGENCY MEDICINE | Admitting: INTERNAL MEDICINE
Payer: COMMERCIAL

## 2024-11-07 ENCOUNTER — APPOINTMENT (OUTPATIENT)
Dept: CARDIOLOGY | Facility: HOSPITAL | Age: 39
End: 2024-11-07
Payer: COMMERCIAL

## 2024-11-07 DIAGNOSIS — R06.2 WHEEZING: ICD-10-CM

## 2024-11-07 DIAGNOSIS — Z78.9 FAILURE OF OUTPATIENT TREATMENT: ICD-10-CM

## 2024-11-07 DIAGNOSIS — J15.9 COMMUNITY ACQUIRED BACTERIAL PNEUMONIA: Primary | ICD-10-CM

## 2024-11-07 DIAGNOSIS — J18.9 COMMUNITY ACQUIRED PNEUMONIA OF LEFT LOWER LOBE OF LUNG: ICD-10-CM

## 2024-11-07 LAB
ALBUMIN SERPL BCP-MCNC: 3.9 G/DL (ref 3.4–5)
ALP SERPL-CCNC: 33 U/L (ref 33–120)
ALT SERPL W P-5'-P-CCNC: 46 U/L (ref 10–52)
ANION GAP SERPL CALC-SCNC: 13 MMOL/L (ref 10–20)
AST SERPL W P-5'-P-CCNC: 41 U/L (ref 9–39)
BASOPHILS # BLD AUTO: 0.02 X10*3/UL (ref 0–0.1)
BASOPHILS NFR BLD AUTO: 0.2 %
BILIRUB SERPL-MCNC: 0.4 MG/DL (ref 0–1.2)
BUN SERPL-MCNC: 25 MG/DL (ref 6–23)
CALCIUM SERPL-MCNC: 8.7 MG/DL (ref 8.6–10.3)
CARDIAC TROPONIN I PNL SERPL HS: 12 NG/L (ref 0–20)
CHLORIDE SERPL-SCNC: 97 MMOL/L (ref 98–107)
CO2 SERPL-SCNC: 26 MMOL/L (ref 21–32)
CREAT SERPL-MCNC: 1.28 MG/DL (ref 0.5–1.3)
EGFRCR SERPLBLD CKD-EPI 2021: 73 ML/MIN/1.73M*2
EOSINOPHIL # BLD AUTO: 0 X10*3/UL (ref 0–0.7)
EOSINOPHIL NFR BLD AUTO: 0 %
ERYTHROCYTE [DISTWIDTH] IN BLOOD BY AUTOMATED COUNT: 12.9 % (ref 11.5–14.5)
FLUAV RNA RESP QL NAA+PROBE: NOT DETECTED
FLUBV RNA RESP QL NAA+PROBE: NOT DETECTED
GLUCOSE SERPL-MCNC: 117 MG/DL (ref 74–99)
HCT VFR BLD AUTO: 51.9 % (ref 41–52)
HGB BLD-MCNC: 17.4 G/DL (ref 13.5–17.5)
IMM GRANULOCYTES # BLD AUTO: 0.06 X10*3/UL (ref 0–0.7)
IMM GRANULOCYTES NFR BLD AUTO: 0.6 % (ref 0–0.9)
LYMPHOCYTES # BLD AUTO: 1.22 X10*3/UL (ref 1.2–4.8)
LYMPHOCYTES NFR BLD AUTO: 12.7 %
MCH RBC QN AUTO: 29.3 PG (ref 26–34)
MCHC RBC AUTO-ENTMCNC: 33.5 G/DL (ref 32–36)
MCV RBC AUTO: 88 FL (ref 80–100)
MONOCYTES # BLD AUTO: 0.56 X10*3/UL (ref 0.1–1)
MONOCYTES NFR BLD AUTO: 5.8 %
NEUTROPHILS # BLD AUTO: 7.74 X10*3/UL (ref 1.2–7.7)
NEUTROPHILS NFR BLD AUTO: 80.7 %
NRBC BLD-RTO: 0 /100 WBCS (ref 0–0)
PLATELET # BLD AUTO: 178 X10*3/UL (ref 150–450)
POTASSIUM SERPL-SCNC: 3.7 MMOL/L (ref 3.5–5.3)
PROT SERPL-MCNC: 7.4 G/DL (ref 6.4–8.2)
RBC # BLD AUTO: 5.93 X10*6/UL (ref 4.5–5.9)
SARS-COV-2 RNA RESP QL NAA+PROBE: NOT DETECTED
SODIUM SERPL-SCNC: 132 MMOL/L (ref 136–145)
WBC # BLD AUTO: 9.6 X10*3/UL (ref 4.4–11.3)

## 2024-11-07 PROCEDURE — 2500000004 HC RX 250 GENERAL PHARMACY W/ HCPCS (ALT 636 FOR OP/ED): Performed by: PHYSICIAN ASSISTANT

## 2024-11-07 PROCEDURE — 71275 CT ANGIOGRAPHY CHEST: CPT | Performed by: RADIOLOGY

## 2024-11-07 PROCEDURE — 36415 COLL VENOUS BLD VENIPUNCTURE: CPT | Performed by: EMERGENCY MEDICINE

## 2024-11-07 PROCEDURE — 94640 AIRWAY INHALATION TREATMENT: CPT

## 2024-11-07 PROCEDURE — 93005 ELECTROCARDIOGRAM TRACING: CPT

## 2024-11-07 PROCEDURE — 36415 COLL VENOUS BLD VENIPUNCTURE: CPT | Performed by: PHYSICIAN ASSISTANT

## 2024-11-07 PROCEDURE — 1200000002 HC GENERAL ROOM WITH TELEMETRY DAILY

## 2024-11-07 PROCEDURE — 96372 THER/PROPH/DIAG INJ SC/IM: CPT | Performed by: PHYSICIAN ASSISTANT

## 2024-11-07 PROCEDURE — 2500000004 HC RX 250 GENERAL PHARMACY W/ HCPCS (ALT 636 FOR OP/ED): Performed by: EMERGENCY MEDICINE

## 2024-11-07 PROCEDURE — 87636 SARSCOV2 & INF A&B AMP PRB: CPT | Performed by: EMERGENCY MEDICINE

## 2024-11-07 PROCEDURE — 2500000002 HC RX 250 W HCPCS SELF ADMINISTERED DRUGS (ALT 637 FOR MEDICARE OP, ALT 636 FOR OP/ED): Performed by: INTERNAL MEDICINE

## 2024-11-07 PROCEDURE — 96365 THER/PROPH/DIAG IV INF INIT: CPT | Mod: 59

## 2024-11-07 PROCEDURE — 96368 THER/DIAG CONCURRENT INF: CPT

## 2024-11-07 PROCEDURE — 99285 EMERGENCY DEPT VISIT HI MDM: CPT | Mod: 25

## 2024-11-07 PROCEDURE — 71046 X-RAY EXAM CHEST 2 VIEWS: CPT | Performed by: RADIOLOGY

## 2024-11-07 PROCEDURE — 71046 X-RAY EXAM CHEST 2 VIEWS: CPT

## 2024-11-07 PROCEDURE — 96375 TX/PRO/DX INJ NEW DRUG ADDON: CPT

## 2024-11-07 PROCEDURE — 85025 COMPLETE CBC W/AUTO DIFF WBC: CPT | Performed by: EMERGENCY MEDICINE

## 2024-11-07 PROCEDURE — 99223 1ST HOSP IP/OBS HIGH 75: CPT | Performed by: PHYSICIAN ASSISTANT

## 2024-11-07 PROCEDURE — 84484 ASSAY OF TROPONIN QUANT: CPT | Performed by: EMERGENCY MEDICINE

## 2024-11-07 PROCEDURE — 96366 THER/PROPH/DIAG IV INF ADDON: CPT

## 2024-11-07 PROCEDURE — 80053 COMPREHEN METABOLIC PANEL: CPT | Performed by: EMERGENCY MEDICINE

## 2024-11-07 PROCEDURE — 87040 BLOOD CULTURE FOR BACTERIA: CPT | Mod: PARLAB | Performed by: PHYSICIAN ASSISTANT

## 2024-11-07 PROCEDURE — 87635 SARS-COV-2 COVID-19 AMP PRB: CPT | Performed by: EMERGENCY MEDICINE

## 2024-11-07 PROCEDURE — 71275 CT ANGIOGRAPHY CHEST: CPT

## 2024-11-07 PROCEDURE — 2550000001 HC RX 255 CONTRASTS: Performed by: EMERGENCY MEDICINE

## 2024-11-07 RX ORDER — BENZONATATE 100 MG/1
200 CAPSULE ORAL 3 TIMES DAILY PRN
Status: DISCONTINUED | OUTPATIENT
Start: 2024-11-07 | End: 2024-11-09 | Stop reason: HOSPADM

## 2024-11-07 RX ORDER — ALBUTEROL SULFATE 0.83 MG/ML
2.5 SOLUTION RESPIRATORY (INHALATION) 3 TIMES DAILY
Status: DISCONTINUED | OUTPATIENT
Start: 2024-11-07 | End: 2024-11-08

## 2024-11-07 RX ORDER — ACETAMINOPHEN 325 MG/1
650 TABLET ORAL EVERY 4 HOURS PRN
Status: DISCONTINUED | OUTPATIENT
Start: 2024-11-07 | End: 2024-11-09 | Stop reason: HOSPADM

## 2024-11-07 RX ORDER — ONDANSETRON 4 MG/1
4 TABLET, FILM COATED ORAL EVERY 6 HOURS PRN
Status: DISCONTINUED | OUTPATIENT
Start: 2024-11-07 | End: 2024-11-09 | Stop reason: HOSPADM

## 2024-11-07 RX ORDER — ONDANSETRON HYDROCHLORIDE 2 MG/ML
4 INJECTION, SOLUTION INTRAVENOUS EVERY 6 HOURS PRN
Status: DISCONTINUED | OUTPATIENT
Start: 2024-11-07 | End: 2024-11-09 | Stop reason: HOSPADM

## 2024-11-07 RX ORDER — ENOXAPARIN SODIUM 100 MG/ML
40 INJECTION SUBCUTANEOUS EVERY 12 HOURS SCHEDULED
Status: DISCONTINUED | OUTPATIENT
Start: 2024-11-07 | End: 2024-11-09 | Stop reason: HOSPADM

## 2024-11-07 RX ORDER — ACETAMINOPHEN 160 MG/5ML
650 SOLUTION ORAL EVERY 4 HOURS PRN
Status: DISCONTINUED | OUTPATIENT
Start: 2024-11-07 | End: 2024-11-09 | Stop reason: HOSPADM

## 2024-11-07 RX ORDER — IPRATROPIUM BROMIDE AND ALBUTEROL SULFATE 2.5; .5 MG/3ML; MG/3ML
3 SOLUTION RESPIRATORY (INHALATION) EVERY 4 HOURS PRN
Status: DISCONTINUED | OUTPATIENT
Start: 2024-11-07 | End: 2024-11-09 | Stop reason: HOSPADM

## 2024-11-07 RX ORDER — ACETAMINOPHEN 650 MG/1
650 SUPPOSITORY RECTAL EVERY 4 HOURS PRN
Status: DISCONTINUED | OUTPATIENT
Start: 2024-11-07 | End: 2024-11-09 | Stop reason: HOSPADM

## 2024-11-07 RX ORDER — SODIUM CHLORIDE 9 MG/ML
100 INJECTION, SOLUTION INTRAVENOUS CONTINUOUS
Status: DISCONTINUED | OUTPATIENT
Start: 2024-11-07 | End: 2024-11-08

## 2024-11-07 RX ORDER — CEFTRIAXONE 2 G/50ML
2 INJECTION, SOLUTION INTRAVENOUS EVERY 24 HOURS
Status: DISCONTINUED | OUTPATIENT
Start: 2024-11-08 | End: 2024-11-09 | Stop reason: HOSPADM

## 2024-11-07 RX ORDER — CEFTRIAXONE 1 G/50ML
1 INJECTION, SOLUTION INTRAVENOUS ONCE
Status: COMPLETED | OUTPATIENT
Start: 2024-11-07 | End: 2024-11-07

## 2024-11-07 ASSESSMENT — ACTIVITIES OF DAILY LIVING (ADL)
GROOMING: INDEPENDENT
WALKS IN HOME: INDEPENDENT
HEARING - RIGHT EAR: FUNCTIONAL
FEEDING YOURSELF: INDEPENDENT
ADEQUATE_TO_COMPLETE_ADL: YES
TOILETING: INDEPENDENT
JUDGMENT_ADEQUATE_SAFELY_COMPLETE_DAILY_ACTIVITIES: YES
PATIENT'S MEMORY ADEQUATE TO SAFELY COMPLETE DAILY ACTIVITIES?: YES
BATHING: INDEPENDENT
DRESSING YOURSELF: INDEPENDENT
HEARING - LEFT EAR: FUNCTIONAL

## 2024-11-07 ASSESSMENT — LIFESTYLE VARIABLES
EVER HAD A DRINK FIRST THING IN THE MORNING TO STEADY YOUR NERVES TO GET RID OF A HANGOVER: NO
TOTAL SCORE: 0
EVER FELT BAD OR GUILTY ABOUT YOUR DRINKING: NO
HAVE YOU EVER FELT YOU SHOULD CUT DOWN ON YOUR DRINKING: NO
HAVE PEOPLE ANNOYED YOU BY CRITICIZING YOUR DRINKING: NO

## 2024-11-07 ASSESSMENT — COGNITIVE AND FUNCTIONAL STATUS - GENERAL
MOBILITY SCORE: 24
DAILY ACTIVITIY SCORE: 24
PATIENT BASELINE BEDBOUND: NO

## 2024-11-07 ASSESSMENT — PAIN - FUNCTIONAL ASSESSMENT
PAIN_FUNCTIONAL_ASSESSMENT: 0-10
PAIN_FUNCTIONAL_ASSESSMENT: 0-10

## 2024-11-07 ASSESSMENT — COLUMBIA-SUICIDE SEVERITY RATING SCALE - C-SSRS
2. HAVE YOU ACTUALLY HAD ANY THOUGHTS OF KILLING YOURSELF?: NO
1. IN THE PAST MONTH, HAVE YOU WISHED YOU WERE DEAD OR WISHED YOU COULD GO TO SLEEP AND NOT WAKE UP?: NO
6. HAVE YOU EVER DONE ANYTHING, STARTED TO DO ANYTHING, OR PREPARED TO DO ANYTHING TO END YOUR LIFE?: NO

## 2024-11-07 ASSESSMENT — PAIN DESCRIPTION - DESCRIPTORS: DESCRIPTORS: SHARP

## 2024-11-07 ASSESSMENT — PAIN DESCRIPTION - PAIN TYPE: TYPE: ACUTE PAIN

## 2024-11-07 ASSESSMENT — PAIN DESCRIPTION - LOCATION: LOCATION: CHEST

## 2024-11-07 ASSESSMENT — PAIN SCALES - GENERAL
PAINLEVEL_OUTOF10: 8
PAINLEVEL_OUTOF10: 0 - NO PAIN

## 2024-11-07 NOTE — H&P
History Of Present Illness  Rolando Trotter is a 39 y.o. male with past medical history significant for obesity, and hypertension who presents to the ED for evaluation of shortness of breath and a cough.  States he went to a company event last Friday and felt fine, however reports he came back feeling quite sick.  Does not know anyone else who was sick at the work event.  Admits to subjective fevers, chills, body aches, and generalized weakness over the past week.  States he has not been able to get up from bed very much over this week.  States he has been coughing quite a bit and admits to associated chest pains.  Reports a productive cough with green/brown/bloody mucus.  States his breathing is worse with exertion, however has had constant shortness of breath over the past week.  Reports intermittent wheezing as well.  Also admits to some nausea and decreased appetite over the past week.  States he has still been trying to drink water and Gatorade daily.  Says that when he woke up this morning he felt like he could not take a deep breath and wanted to come to the ED for further evaluation.  Went to the urgent care 2 days ago and had a negative chest x-ray.  Patient was given prescription for benzonatate, doxycycline, and Zofran.  Denies any improvements.  Denies recent travel.  Denies lower extremity pain or swelling.  Denies smoking, illicit drug use, or alcohol use.  Denies new or change in medications.  Denies any significant past medical history.  Denies history of lung issues.  Denies headaches, dizziness, abdominal pain, vomiting, or urinary/bowel changes.    ED course: On arrival to the ED, patient was afebrile and otherwise hemodynamically stable with SpO2 94% on room air.  Patient has been increasingly tachycardic, hypoxic and tachypneic since arrival.  Glucose 117, sodium 132, chloride 97, BUN 25/creatinine 1.28.  AST 41.  Initial troponin 12.  WBC 9.6.  Flu, COVID-negative.  Chest x-ray shows bibasilar  densities consistent with atelectasis with small areas of pneumonia not excluded.  CTA chest shows no evidence for PE, does show left lower lobe alveolar consolidation consistent with pneumonia.  Patient given IV azithromycin and IV Rocephin in the ED.    EKG (interpreted by ED physician): Sinus tachycardia, rate 116, no acute ischemic changes, no ST elevations, normal QRS duration.    Admitting provider is Dr. Aguayo     Past Medical History  History reviewed. No pertinent past medical history.    Surgical History  History reviewed. No pertinent surgical history.     Social History  He reports that he has never smoked. His smokeless tobacco use includes chew. He reports current alcohol use. He reports that he does not use drugs.    Family History  No family history on file.     Allergies  Patient has no known allergies.    Review of Systems  10 point review of system negative except as noted above in HPI    Physical Exam  Constitutional:       General: He is not in acute distress.     Appearance: Normal appearance. He is diaphoretic.   HENT:      Head: Normocephalic and atraumatic.      Mouth/Throat:      Mouth: Mucous membranes are moist.      Pharynx: Oropharynx is clear.   Eyes:      Extraocular Movements: Extraocular movements intact.      Conjunctiva/sclera: Conjunctivae normal.      Pupils: Pupils are equal, round, and reactive to light.   Cardiovascular:      Rate and Rhythm: Regular rhythm. Tachycardia present.      Pulses: Normal pulses.      Heart sounds: Normal heart sounds.   Pulmonary:      Effort: No respiratory distress.      Breath sounds: Normal breath sounds. No wheezing or rhonchi.      Comments: Tachypnea.  Currently on room air, SpO2 93%.  No conversational dyspnea.  Symmetric chest expansion.  Actively coughing during exam.  Abdominal:      General: Bowel sounds are normal.      Palpations: Abdomen is soft.      Tenderness: There is no abdominal tenderness.   Musculoskeletal:         General:  "Normal range of motion.      Right lower leg: No edema.      Left lower leg: No edema.   Skin:     General: Skin is warm.   Neurological:      General: No focal deficit present.      Mental Status: He is alert and oriented to person, place, and time.   Psychiatric:         Mood and Affect: Mood normal.         Behavior: Behavior normal.       Last Recorded Vitals  Blood pressure 131/81, pulse (!) 116, temperature 37.4 °C (99.3 °F), temperature source Temporal, resp. rate (!) 29, height 1.753 m (5' 9\"), weight 124 kg (273 lb), SpO2 (!) 93%.    Relevant Results  Results for orders placed or performed during the hospital encounter of 11/07/24 (from the past 24 hours)   CBC and Auto Differential   Result Value Ref Range    WBC 9.6 4.4 - 11.3 x10*3/uL    nRBC 0.0 0.0 - 0.0 /100 WBCs    RBC 5.93 (H) 4.50 - 5.90 x10*6/uL    Hemoglobin 17.4 13.5 - 17.5 g/dL    Hematocrit 51.9 41.0 - 52.0 %    MCV 88 80 - 100 fL    MCH 29.3 26.0 - 34.0 pg    MCHC 33.5 32.0 - 36.0 g/dL    RDW 12.9 11.5 - 14.5 %    Platelets 178 150 - 450 x10*3/uL    Neutrophils % 80.7 40.0 - 80.0 %    Immature Granulocytes %, Automated 0.6 0.0 - 0.9 %    Lymphocytes % 12.7 13.0 - 44.0 %    Monocytes % 5.8 2.0 - 10.0 %    Eosinophils % 0.0 0.0 - 6.0 %    Basophils % 0.2 0.0 - 2.0 %    Neutrophils Absolute 7.74 (H) 1.20 - 7.70 x10*3/uL    Immature Granulocytes Absolute, Automated 0.06 0.00 - 0.70 x10*3/uL    Lymphocytes Absolute 1.22 1.20 - 4.80 x10*3/uL    Monocytes Absolute 0.56 0.10 - 1.00 x10*3/uL    Eosinophils Absolute 0.00 0.00 - 0.70 x10*3/uL    Basophils Absolute 0.02 0.00 - 0.10 x10*3/uL   Comprehensive Metabolic Panel   Result Value Ref Range    Glucose 117 (H) 74 - 99 mg/dL    Sodium 132 (L) 136 - 145 mmol/L    Potassium 3.7 3.5 - 5.3 mmol/L    Chloride 97 (L) 98 - 107 mmol/L    Bicarbonate 26 21 - 32 mmol/L    Anion Gap 13 10 - 20 mmol/L    Urea Nitrogen 25 (H) 6 - 23 mg/dL    Creatinine 1.28 0.50 - 1.30 mg/dL    eGFR 73 >60 mL/min/1.73m*2    " Calcium 8.7 8.6 - 10.3 mg/dL    Albumin 3.9 3.4 - 5.0 g/dL    Alkaline Phosphatase 33 33 - 120 U/L    Total Protein 7.4 6.4 - 8.2 g/dL    AST 41 (H) 9 - 39 U/L    Bilirubin, Total 0.4 0.0 - 1.2 mg/dL    ALT 46 10 - 52 U/L   Troponin I, High Sensitivity   Result Value Ref Range    Troponin I, High Sensitivity 12 0 - 20 ng/L   Sars-CoV-2 PCR   Result Value Ref Range    Coronavirus 2019, PCR Not Detected Not Detected   Influenza A, and B PCR   Result Value Ref Range    Flu A Result Not Detected Not Detected    Flu B Result Not Detected Not Detected      CT angio chest for pulmonary embolism    Result Date: 11/7/2024  Interpreted By:  Elliott Chua, STUDY: CT ANGIO CHEST FOR PULMONARY EMBOLISM; 11/7/2024 10:08 am   INDICATION: Signs/Symptoms:chest pain, tachycardia. Chest Pain.   COMPARISON: None.   ACCESSION NUMBER(S): [id number]   ORDERING CLINICIAN: EDDIE URIBE   TECHNIQUE: 75 ml of non-ionic iodinated contrast was injected intravenously.   CT angiography (non-coronary) of the chest was performed with Intravenous contrast material along with 3D (maximum intensity projection - MIP) image post processing.   One or more of the following dose reduction techniques were used: Automated exposure control Adjustment of the mA and/or kV according to patient size, and/or use of iterative reconstruction technique.   FINDINGS: There is no evidence for aortic dissection or pericardial effusion. Ascending thoracic aorta measures 3.7 cm in diameter. There is no evidence for pulmonary embolism.   There is left lower lobe alveolar consolidation consistent with pneumonia. There is associated left basilar atelectasis as well as right middle lobe atelectasis. There is right lower lobe subsegmental atelectasis. There is also linear subsegmental atelectasis right middle lobe. No effusion is identified.   Chest Wall: No chest wall abnormalities are identified.   Upper Abdominal Findings: No pathologic findings are identified  involving the visualized portions of the upper abdomen.   Skeletal System: No acute fractures or destructive lesions are identified.       1. No evidence for pulmonary embolism. 2. Left lower lobe alveolar consolidation consistent with pneumonia. Associated bibasilar atelectasis is noted.   MACRO: none   Signed by: Elliott Chua 11/7/2024 10:32 AM Dictation workstation:   [id number]    XR chest 2 views    Result Date: 11/7/2024  Interpreted By:  Elliott Chua, STUDY: XR CHEST 2 VIEWS; 11/7/2024 8:58 am   INDICATION: Signs/Symptoms:chest pain.   COMPARISON: 11/05/2024   ACCESSION NUMBER(S): [id number]   ORDERING CLINICIAN: EDDIE URIBE   TECHNIQUE: PA and lateral views of the chest were obtained.   FINDINGS: The cardiac silhouette is normal in appearance. Bilateral lower lobe subsegmental atelectasis is present. No alveolar consolidation is noted. Patchy infiltrate is not excluded. No effusions are identified.       Bibasilar densities consistent with atelectasis with small areas of pneumonia not excluded.   MACRO: none   Signed by: Elliott Chua 11/7/2024 9:35 AM Dictation workstation:   [id number]    XR chest 2 views    Result Date: 11/5/2024  Interpreted By:  Chris Uribe, STUDY: XR CHEST 2 VIEWS  11/5/2024 11:32 am   INDICATION: Signs/Symptoms:cough/fever  pneumonia rule out   COMPARISON: 02/01/2024   ACCESSION NUMBER(S): [id number]   ORDERING CLINICIAN: SHADE TREJO   TECHNIQUE: PA and lateral views of the chest were obtained.   FINDINGS: No focal infiltrate, pleural effusion or pneumothorax is identified. The cardiac silhouette is within normal limits for size.       No focal infiltrate or pneumothorax.   MACRO: None.   Signed by: Chris Uribe 11/5/2024 12:15 PM Dictation workstation:   [id number]       Assessment/Plan   Assessment & Plan  Community acquired pneumonia of left lower lobe of lung      Rolando Trotter is a 39 y.o. male presents to the ED for evaluation of shortness of breath,  productive cough, subjective fevers, chills, body aches, and generalized weakness over the past week.  States he went to a [employer] event last Friday and felt fine, however reports he came back feeling quite sick. States he has not been able to get up from bed very much over this week. States he has been coughing quite a bit and admits to associated chest pains.  Reports a productive cough with green/brown/bloody mucus.  States his breathing is worse with exertion, however has had constant shortness of breath over the past week. Went to the urgent care 2 days ago and had a negative chest x-ray.  Patient was given prescription for benzonatate, doxycycline, and Zofran.      #Left lower lobe pneumonia  #Productive cough  #Shortness of breath  #Tachycardia  #Tachypnea  Admit for observation with telemetry monitoring  CTA chest shows no evidence for pulmonary embolism, however does show left lower lobe alveolar consolidation consistent with pneumonia.  IV Rocephin, IV azithromycin daily  Breathing treatments as needed  125 mg IV Solu-Medrol once, 40 mg IV Solu-Medrol Q 8  Blood cultures ordered  Tylenol, Zofran as needed  Benzonatate as needed for cough  Oxygen as needed  Procalcitonin ordered  Urine pneumonia antigens ordered  Regular diet  Q 4 vitals  CBC, BMP in the a.m.  PT/OT for evaluation and treatment    #Mild VERONICA  #Hyponatremia  #Hypochloremia  Patient has had decreased appetite/oral intake over this past week  IV fluids x 24 hours  Monitor with a.m. labs    Continue home medications as appropriate    Chronic conditions:  Obesity, hypertension    #DVT prophylaxis  Lovenox twice daily  SCDs, ambulation as tolerated       I spent 45 minutes in the professional and overall care of this patient.    Cole Ramirez PA-C

## 2024-11-07 NOTE — H&P
History Of Present Illness/patient seen and examined by me in the ED.  Patient is sitting in bed and eating his dinner.  History obtained from the patient and ED notes.  Patient states that he does not see a PCP but saw 1 a year ago and never went back.  Rolando Trotter is a 39 y.o. male with past medical history significant for obesity, and hypertension who presents to the ED for evaluation of shortness of breath and a cough.  States he went to a company event last Friday and felt fine, however reports he came back feeling quite sick.  Does not know anyone else who was sick at the work event.  Patient states that he had been having chills/fever chills, body aches, and generalized weakness over the past week.  States he has not been able to get up from bed very much over this week.  States he has been coughing and phlegm is dark yellow to brownish   and admits to associated chest pains.  Patient also states that he had been feeling short of breath.  Reports a productive cough with green/brown/bloody mucus.  States his breathing is worse with exertion, however has had constant shortness of breath over the past week.  Reports intermittent wheezing as well.  Also admits to some nausea and decreased appetite over the past week.  States he has still been trying to drink water and Gatorade daily.  Says that when he woke up this morning he felt like he could not take a deep breath and wanted to come to the ED for further evaluation.  Went to the urgent care 2 days ago and had a negative chest x-ray.  Patient was given prescription for benzonatate, doxycycline, and Zofran.  Denies any improvements.  Denies recent travel.  Denies lower extremity pain or swelling.  Denies smoking, illicit drug use, or alcohol use.  Denies new or change in medications.  Denies any significant past medical history.  Denies history of lung issues.  Denies headaches, dizziness, abdominal pain, vomiting, or urinary/bowel changes.    ED course: On  arrival to the ED, patient was afebrile and otherwise hemodynamically stable with SpO2 94% on room air.  Patient has been increasingly tachycardic, hypoxic and tachypneic since arrival.  Glucose 117, sodium 132, chloride 97, BUN 25/creatinine 1.28.  AST 41.  Initial troponin 12.  WBC 9.6.  Flu, COVID-negative.  Chest x-ray shows bibasilar densities consistent with atelectasis with small areas of pneumonia not excluded.  CTA chest shows no evidence for PE, does show left lower lobe alveolar consolidation consistent with pneumonia.  Patient given IV azithromycin and IV Rocephin in the ED.    EKG (interpreted by ED physician): Sinus tachycardia, rate 116, no acute ischemic changes, no ST elevations, normal QRS duration.    Patient has been admitted under my service and will be transferred to telemetry bed when bed available.  Currently patient is not hypoxic and not on any oxygen but he remains tachycardic with a heart rate in the 1 teens.  He states he is feeling better.  He appears in no acute distress.     Past Medical History  History reviewed. No pertinent past medical history.    Surgical History  History reviewed. No pertinent surgical history.     Social History  He reports that he has never smoked. His smokeless tobacco use includes chew. He reports current alcohol use. He reports that he does not use drugs.  Patient states that he is .  He has a child who is 6 years old.  His next of kin are listed as his parents.  Patient denies any smoking or alcohol use or drug abuse.  Family History  No family history on file.     Allergies  Patient has no known allergies.    Review of Systems  10 point review of system negative except as noted above in HPI    Physical Exam  Vitals and nursing note reviewed.   Constitutional:       General: He is not in acute distress.     Appearance: Normal appearance. He is well-developed and well-groomed. He is obese. He is not ill-appearing, toxic-appearing or diaphoretic.       Comments: Awake alert oriented x 3 and is feeling much better since his arrival to the ED.   HENT:      Head: Normocephalic and atraumatic.      Right Ear: Tympanic membrane and ear canal normal. There is no impacted cerumen.      Left Ear: Tympanic membrane, ear canal and external ear normal.      Nose: Nose normal. No congestion or rhinorrhea.      Mouth/Throat:      Mouth: Mucous membranes are moist.      Pharynx: Oropharynx is clear. No oropharyngeal exudate or posterior oropharyngeal erythema.   Eyes:      General: No scleral icterus.        Right eye: No discharge.         Left eye: No discharge.      Extraocular Movements: Extraocular movements intact.      Conjunctiva/sclera: Conjunctivae normal.      Pupils: Pupils are equal, round, and reactive to light.   Neck:      Vascular: No carotid bruit.   Cardiovascular:      Rate and Rhythm: Regular rhythm. Tachycardia present.      Chest Wall: PMI is not displaced. No thrill.      Pulses: Normal pulses.      Heart sounds: Normal heart sounds, S1 normal and S2 normal. No murmur heard.     No gallop.   Pulmonary:      Effort: Pulmonary effort is normal. No tachypnea or respiratory distress.      Breath sounds: Normal breath sounds. No wheezing or rales.      Comments: Tachypnea.  Currently on room air, SpO2 93%.  No conversational dyspnea.  Symmetric chest expansion.  Actively coughing during exam.  Patient has bilateral rhonchi and few expiratory wheezing left lung.  Abdominal:      General: Abdomen is flat. Bowel sounds are normal. There is no distension.      Palpations: Abdomen is soft. There is no mass.      Tenderness: There is no abdominal tenderness. There is no right CVA tenderness, left CVA tenderness, guarding or rebound.      Hernia: No hernia is present.   Musculoskeletal:         General: No swelling or tenderness. Normal range of motion.      Cervical back: Normal range of motion and neck supple. No rigidity.      Right lower leg: No edema.       "Left lower leg: No edema.   Lymphadenopathy:      Cervical: No cervical adenopathy.   Skin:     General: Skin is warm.      Coloration: Skin is not jaundiced.      Findings: No erythema or rash.   Neurological:      General: No focal deficit present.      Mental Status: He is alert and oriented to person, place, and time. Mental status is at baseline.      Sensory: No sensory deficit.      Motor: No weakness.      Gait: Gait normal.      Deep Tendon Reflexes: Reflexes normal.   Psychiatric:         Mood and Affect: Mood normal.         Behavior: Behavior normal. Behavior is cooperative.         Thought Content: Thought content normal.         Judgment: Judgment normal.       Last Recorded Vitals  Blood pressure 138/77, pulse (!) 116, temperature 37.4 °C (99.3 °F), temperature source Temporal, resp. rate (!) 31, height 1.753 m (5' 9\"), weight 124 kg (273 lb), SpO2 (!) 92%.    Relevant Results  Results for orders placed or performed during the hospital encounter of 11/07/24 (from the past 24 hours)   CBC and Auto Differential   Result Value Ref Range    WBC 9.6 4.4 - 11.3 x10*3/uL    nRBC 0.0 0.0 - 0.0 /100 WBCs    RBC 5.93 (H) 4.50 - 5.90 x10*6/uL    Hemoglobin 17.4 13.5 - 17.5 g/dL    Hematocrit 51.9 41.0 - 52.0 %    MCV 88 80 - 100 fL    MCH 29.3 26.0 - 34.0 pg    MCHC 33.5 32.0 - 36.0 g/dL    RDW 12.9 11.5 - 14.5 %    Platelets 178 150 - 450 x10*3/uL    Neutrophils % 80.7 40.0 - 80.0 %    Immature Granulocytes %, Automated 0.6 0.0 - 0.9 %    Lymphocytes % 12.7 13.0 - 44.0 %    Monocytes % 5.8 2.0 - 10.0 %    Eosinophils % 0.0 0.0 - 6.0 %    Basophils % 0.2 0.0 - 2.0 %    Neutrophils Absolute 7.74 (H) 1.20 - 7.70 x10*3/uL    Immature Granulocytes Absolute, Automated 0.06 0.00 - 0.70 x10*3/uL    Lymphocytes Absolute 1.22 1.20 - 4.80 x10*3/uL    Monocytes Absolute 0.56 0.10 - 1.00 x10*3/uL    Eosinophils Absolute 0.00 0.00 - 0.70 x10*3/uL    Basophils Absolute 0.02 0.00 - 0.10 x10*3/uL   Comprehensive Metabolic Panel "   Result Value Ref Range    Glucose 117 (H) 74 - 99 mg/dL    Sodium 132 (L) 136 - 145 mmol/L    Potassium 3.7 3.5 - 5.3 mmol/L    Chloride 97 (L) 98 - 107 mmol/L    Bicarbonate 26 21 - 32 mmol/L    Anion Gap 13 10 - 20 mmol/L    Urea Nitrogen 25 (H) 6 - 23 mg/dL    Creatinine 1.28 0.50 - 1.30 mg/dL    eGFR 73 >60 mL/min/1.73m*2    Calcium 8.7 8.6 - 10.3 mg/dL    Albumin 3.9 3.4 - 5.0 g/dL    Alkaline Phosphatase 33 33 - 120 U/L    Total Protein 7.4 6.4 - 8.2 g/dL    AST 41 (H) 9 - 39 U/L    Bilirubin, Total 0.4 0.0 - 1.2 mg/dL    ALT 46 10 - 52 U/L   Troponin I, High Sensitivity   Result Value Ref Range    Troponin I, High Sensitivity 12 0 - 20 ng/L   Sars-CoV-2 PCR   Result Value Ref Range    Coronavirus 2019, PCR Not Detected Not Detected   Influenza A, and B PCR   Result Value Ref Range    Flu A Result Not Detected Not Detected    Flu B Result Not Detected Not Detected      CT angio chest for pulmonary embolism    Result Date: 11/7/2024  Interpreted By:  Elliott Chua, STUDY: CT ANGIO CHEST FOR PULMONARY EMBOLISM; 11/7/2024 10:08 am   INDICATION: Signs/Symptoms:chest pain, tachycardia. Chest Pain.   COMPARISON: None.   ACCESSION NUMBER(S): [id number]   ORDERING CLINICIAN: EDDIE URIBE   TECHNIQUE: 75 ml of non-ionic iodinated contrast was injected intravenously.   CT angiography (non-coronary) of the chest was performed with Intravenous contrast material along with 3D (maximum intensity projection - MIP) image post processing.   One or more of the following dose reduction techniques were used: Automated exposure control Adjustment of the mA and/or kV according to patient size, and/or use of iterative reconstruction technique.   FINDINGS: There is no evidence for aortic dissection or pericardial effusion. Ascending thoracic aorta measures 3.7 cm in diameter. There is no evidence for pulmonary embolism.   There is left lower lobe alveolar consolidation consistent with pneumonia. There is associated left  basilar atelectasis as well as right middle lobe atelectasis. There is right lower lobe subsegmental atelectasis. There is also linear subsegmental atelectasis right middle lobe. No effusion is identified.   Chest Wall: No chest wall abnormalities are identified.   Upper Abdominal Findings: No pathologic findings are identified involving the visualized portions of the upper abdomen.   Skeletal System: No acute fractures or destructive lesions are identified.       1. No evidence for pulmonary embolism. 2. Left lower lobe alveolar consolidation consistent with pneumonia. Associated bibasilar atelectasis is noted.   MACRO: none   Signed by: Elliott Chua 11/7/2024 10:32 AM Dictation workstation:   [id number]    XR chest 2 views    Result Date: 11/7/2024  Interpreted By:  Elliott Chua, STUDY: XR CHEST 2 VIEWS; 11/7/2024 8:58 am   INDICATION: Signs/Symptoms:chest pain.   COMPARISON: 11/05/2024   ACCESSION NUMBER(S): [id number]   ORDERING CLINICIAN: EDDIE URIBE   TECHNIQUE: PA and lateral views of the chest were obtained.   FINDINGS: The cardiac silhouette is normal in appearance. Bilateral lower lobe subsegmental atelectasis is present. No alveolar consolidation is noted. Patchy infiltrate is not excluded. No effusions are identified.       Bibasilar densities consistent with atelectasis with small areas of pneumonia not excluded.   MACRO: none   Signed by: Elliott Chua 11/7/2024 9:35 AM Dictation workstation:   [id number]    XR chest 2 views    Result Date: 11/5/2024  Interpreted By:  Chris Uribe, STUDY: XR CHEST 2 VIEWS  11/5/2024 11:32 am   INDICATION: Signs/Symptoms:cough/fever  pneumonia rule out   COMPARISON: 02/01/2024   ACCESSION NUMBER(S): [id number]   ORDERING CLINICIAN: SHADE TREJO   TECHNIQUE: PA and lateral views of the chest were obtained.   FINDINGS: No focal infiltrate, pleural effusion or pneumothorax is identified. The cardiac silhouette is within normal limits for size.        No focal infiltrate or pneumothorax.   MACRO: None.   Signed by: Chris Uribe 11/5/2024 12:15 PM Dictation workstation:   [id number]       Assessment/Plan   Assessment & Plan  Community acquired pneumonia of left lower lobe of lung    Community acquired bacterial pneumonia      Rolando Trotter is a 39 y.o. male presents to the ED for evaluation of shortness of breath, productive cough, subjective fevers, chills, body aches, and generalized weakness over the past week.  States he went to a [employer] event last Friday and felt fine, however reports he came back feeling quite sick. States he has not been able to get up from bed very much over this week. States he has been coughing quite a bit and admits to associated chest pains.  Reports a productive cough with green/brown/bloody mucus.  States his breathing is worse with exertion, however has had constant shortness of breath over the past week. Went to the urgent care 2 days ago and had a negative chest x-ray.  Patient was given prescription for benzonatate, doxycycline, and Zofran/and failed outpatient treatment and worsening of symptoms and will be admitted to the hospital for acute left lung community-acquired pneumonia and further treatment with IV antibiotics.    #1/# Acute community-acquired left lower lobe pneumonia/tachycardic and tachypneic  #Productive cough  #Shortness of breath  #Tachycardia  #Tachypnea  Patient has been started on IV Zithromax and IV Rocephin.  Patient will also be given albuterol aerosol treatment every 6 hours while awake.  Patient also has been started on IV Solu-Medrol.  CTA chest shows no evidence for pulmonary embolism, however does show left lower lobe alveolar consolidation consistent with pneumonia.  IV Rocephin, IV azithromycin daily  Breathing treatments as needed  125 mg IV Solu-Medrol once, 40 mg IV Solu-Medrol Q 8  Blood cultures ordered  Tylenol, Zofran as needed  Benzonatate as needed for cough  Oxygen as  needed  Procalcitonin ordered  Urine pneumonia antigens ordered  Regular diet  Q 4 vitals  CBC, BMP in the a.m.  PT/OT for evaluation and treatment    #2#Mild VERONICA  #Hyponatremia  #Hypochloremia  Patient has had decreased appetite/oral intake over this past week  IV fluids x 24 hours  Monitor with a.m. labs    Continue home medications as appropriate    Chronic conditions:  #3 morbid obesity,/patient will be advised on losing weight and calorie/carbohydrate controlled diet and regular exercise.  4.  Hypertension/advised on no added salt diet and continue to monitor blood pressure and treat with antihypertensives as needed.    5#DVT prophylaxis  Lovenox twice daily  SCDs, ambulation as tolerated     Vitals and labs and imaging reports reviewed and noted.  ED notes reviewed.  CT scan results noted.  Total time spent 45 minutes/time spent on patient interaction/counseling/assessment and plan and documentation.      James Aguayo MD

## 2024-11-07 NOTE — ED PROVIDER NOTES
HPI   Chief Complaint   Patient presents with    Shortness of Breath     PT. C/O SOB UPON WAKING THIS MORNING, STATES FEELS LIKE CAN'T TAKE DEEP BREATHS. PT.  HAS HAD COLD/FLU SX SINCE FRIDAY, WAS SEEN AT URGENT CARE AND HAD CHEST XRAY. PT.  WAS GIVEN PRESCRIPTION FOR BENZONATATE, DOXYCYCLINE, ZOFRAN. PT. STATES HAVING FEVERS, COUGH, DIZZINESS. PT. ALSO C/O CHEST PAIN RADIATING TO BACK. DENIES SMOKING R RECENT TRAVEL.       39-year-old male presents with shortness of breath and cough.  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  He felt fine then.  Started feeling sick when he came back home.  Fever, chills, body aches, generalized weakness.  He has basically been in bed since then.  Coughing quite a lot.  Now having pain with coughing.  Went to the urgent care 2 days ago and had a negative chest x-ray.  Denies any past medical history.  Does not take any meds.  No recent travel or immobilization.  No lower extremity pain or swelling.  He feels very short of breath at this time and is having pain with inspiration.              Patient History   History reviewed. No pertinent past medical history.  History reviewed. No pertinent surgical history.  No family history on file.  Social History     Tobacco Use    Smoking status: Never    Smokeless tobacco: Current     Types: Chew   Vaping Use    Vaping status: Never Used   Substance Use Topics    Alcohol use: Yes     Comment: occasionally    Drug use: Never       Physical Exam   ED Triage Vitals [11/07/24 0816]   Temperature Heart Rate Respirations BP   37.4 °C (99.3 °F) 96 20 144/71      Pulse Ox Temp Source Heart Rate Source Patient Position   94 % Temporal Monitor Sitting      BP Location FiO2 (%)     Right arm --       Physical Exam  Vitals and nursing note reviewed.   Constitutional:       General: He is in acute distress.      Appearance: He is well-developed. He is ill-appearing and diaphoretic.   HENT:      Head: Normocephalic  and atraumatic.   Eyes:      Conjunctiva/sclera: Conjunctivae normal.   Cardiovascular:      Rate and Rhythm: Normal rate and regular rhythm.      Heart sounds: No murmur heard.  Pulmonary:      Effort: Tachypnea and respiratory distress present.      Breath sounds: Normal breath sounds.   Abdominal:      Palpations: Abdomen is soft.      Tenderness: There is no abdominal tenderness.   Musculoskeletal:         General: No swelling.      Cervical back: Neck supple.   Skin:     General: Skin is warm.      Capillary Refill: Capillary refill takes less than 2 seconds.   Neurological:      Mental Status: He is alert.   Psychiatric:         Mood and Affect: Mood normal.           ED Course & [facility]   ED Course as of 11/07/24 1356   Thu Nov 07, 2024   0930 EKG interpreted by me.  Sinus tachycardia.  Rate 116.  No acute ischemic changes.  No ST elevation.  Normal QRS duration. [CD]   0950 He is still tachycardic in the 120s.  Bibasilar infiltrates on his chest x-ray.  Pleuritic chest pain, negative COVID, negative flu, troponin 12.  Creatinine is 1.28.  No leukocytosis.  I am ordering a CTA to rule out pulmonary embolism and further characterize his chest x-ray abnormalities [CD]   1224 Since he has failed outpatient therapy, he is still tachycardic, and feeling worse, we will admit for IV antibiotics [CD]      ED Course User Index  [CD] Can Narayanan MD         Diagnoses as of 11/07/24 1356   Community acquired bacterial pneumonia   Failure of outpatient treatment                 No data recorded     [place] Coma Scale Score: 15 (11/07/24 0817 : Kiesha Ramon, JEMIMA)                           Medical Decision Making  EKG interpreted by me. Sinus tachycardia. Probable left atrial enlargement. Borderline left axis deviation. ST elev, probable normal early repol pattern.          Procedure  Procedures     Can Narayanan MD  11/07/24 1356       Can Narayanan MD  12/12/24 1245

## 2024-11-07 NOTE — ED TRIAGE NOTES
APC Attestation:  I have personally interviewed and examined the patient via face-to-face. I confirmed the findings listed below:    • Suspected COVID-19 virus infection  (primary encounter diagnosis)  • Fever, unspecified fever cause     This was discussed with the student. I have personally performed the documented history, exam findings, and medical decision making. The plan of care was discussed with the patient and family (mother).    Child with 24 hours of URI symptoms and fever with exposure to father with Covid. Benign exam except for fever and tachycardia both of which resolved with tylenol, ibuprofen and fluids which she drank here without difficulty. Suspect Covid despite negative test. Advised on CDC guidance regarding isolation & masking. Symptomatic tx discussed  Follow-up if symptoms fail to improve or worsening.   Counseled on indications for seeking ER care.     Supervising Physician: Dr. Mcgregor.      PT. C/O SOB UPON WAKING THIS MORNING, STATES FEELS LIKE CAN'T TAKE DEEP BREATHS. PT.  HAS HAD COLD/FLU SX SINCE FRIDAY, WAS SEEN AT URGENT CARE AND HAD CHEST XRAY. PT.  WAS GIVEN PRESCRIPTION FOR BENZONATATE, DOXYCYCLINE, ZOFRAN. PT. STATES HAVING FEVERS, COUGH, DIZZINESS. PT. ALSO C/O CHEST PAIN RADIATING TO BACK. DENIES SMOKING R RECENT TRAVEL.

## 2024-11-07 NOTE — PROGRESS NOTES
Pharmacy Medication History Review    Rolando Trotter is a 39 y.o. male admitted for No Principal Problem: There is no principal problem currently on the Problem List. Please update the Problem List and refresh.. Pharmacy reviewed the patient's [id number] medications and allergies for accuracy.    The list below reflectives the updated PTA list. Please review each medication in order reconciliation for additional clarification and justification.  Prior to Admission medications    Medication Sig Start Date End Date Authorizing Provider   benzonatate (Tessalon Perles) 100 mg capsule Take 2 capsules (200 mg) by mouth every 8 hours for 10 days. Do not crush or chew. 11/5/24 11/15/24 Leighton Villafana PA-C   doxycycline (Vibramycin) 100 mg capsule Take 1 capsule (100 mg) by mouth 2 times a day for 10 days. Take with at least 8 ounces (large glass) of water, do not lie down for 30 minutes after 11/5/24 11/15/24 Leighton Villafana PA-C   ondansetron (Zofran) 4 mg tablet Take 1 tablet (4 mg) by mouth every 8 hours if needed for nausea or vomiting for up to 7 days. 11/5/24 11/12/24 Leighton Villafana PA-C        The list below reflectives the updated allergy list. Please review each documented allergy for additional clarification and justification.  Allergies  Reviewed by Kiesha Ramon RN on 11/7/2024   No Known Allergies         Below are additional concerns with the patient's PTA list.      Nia Baird

## 2024-11-08 LAB
ANION GAP SERPL CALC-SCNC: 11 MMOL/L (ref 10–20)
ATRIAL RATE: 117 BPM
BUN SERPL-MCNC: 19 MG/DL (ref 6–23)
CALCIUM SERPL-MCNC: 8.1 MG/DL (ref 8.6–10.3)
CHLORIDE SERPL-SCNC: 100 MMOL/L (ref 98–107)
CO2 SERPL-SCNC: 23 MMOL/L (ref 21–32)
CREAT SERPL-MCNC: 0.82 MG/DL (ref 0.5–1.3)
EGFRCR SERPLBLD CKD-EPI 2021: >90 ML/MIN/1.73M*2
ERYTHROCYTE [DISTWIDTH] IN BLOOD BY AUTOMATED COUNT: 12.7 % (ref 11.5–14.5)
GLUCOSE SERPL-MCNC: 129 MG/DL (ref 74–99)
HCT VFR BLD AUTO: 45.1 % (ref 41–52)
HGB BLD-MCNC: 15.5 G/DL (ref 13.5–17.5)
MCH RBC QN AUTO: 29.2 PG (ref 26–34)
MCHC RBC AUTO-ENTMCNC: 34.4 G/DL (ref 32–36)
MCV RBC AUTO: 85 FL (ref 80–100)
NRBC BLD-RTO: 0 /100 WBCS (ref 0–0)
P AXIS: 23 DEGREES
PLATELET # BLD AUTO: 172 X10*3/UL (ref 150–450)
POTASSIUM SERPL-SCNC: 3.8 MMOL/L (ref 3.5–5.3)
PR INTERVAL: 150 MS
Q ONSET: 249 MS
QRS COUNT: 18 BEATS
QRS DURATION: 85 MS
QT INTERVAL: 305 MS
QTC CALCULATION(BAZETT): 424 MS
QTC FREDERICIA: 380 MS
R AXIS: -21 DEGREES
RBC # BLD AUTO: 5.3 X10*6/UL (ref 4.5–5.9)
SODIUM SERPL-SCNC: 130 MMOL/L (ref 136–145)
T AXIS: 69 DEGREES
T OFFSET: 402 MS
VENTRICULAR RATE: 116 BPM
WBC # BLD AUTO: 6.1 X10*3/UL (ref 4.4–11.3)

## 2024-11-08 PROCEDURE — 87899 AGENT NOS ASSAY W/OPTIC: CPT | Mod: PARLAB | Performed by: PHYSICIAN ASSISTANT

## 2024-11-08 PROCEDURE — 87449 NOS EACH ORGANISM AG IA: CPT | Mod: PARLAB | Performed by: PHYSICIAN ASSISTANT

## 2024-11-08 PROCEDURE — 94640 AIRWAY INHALATION TREATMENT: CPT

## 2024-11-08 PROCEDURE — 2500000004 HC RX 250 GENERAL PHARMACY W/ HCPCS (ALT 636 FOR OP/ED): Performed by: PHYSICIAN ASSISTANT

## 2024-11-08 PROCEDURE — 80048 BASIC METABOLIC PNL TOTAL CA: CPT | Performed by: PHYSICIAN ASSISTANT

## 2024-11-08 PROCEDURE — 36415 COLL VENOUS BLD VENIPUNCTURE: CPT | Performed by: PHYSICIAN ASSISTANT

## 2024-11-08 PROCEDURE — 1200000002 HC GENERAL ROOM WITH TELEMETRY DAILY

## 2024-11-08 PROCEDURE — 2500000002 HC RX 250 W HCPCS SELF ADMINISTERED DRUGS (ALT 637 FOR MEDICARE OP, ALT 636 FOR OP/ED): Performed by: INTERNAL MEDICINE

## 2024-11-08 PROCEDURE — 85027 COMPLETE CBC AUTOMATED: CPT | Performed by: PHYSICIAN ASSISTANT

## 2024-11-08 RX ORDER — ALBUTEROL SULFATE 0.83 MG/ML
2.5 SOLUTION RESPIRATORY (INHALATION) EVERY 2 HOUR PRN
Status: DISCONTINUED | OUTPATIENT
Start: 2024-11-08 | End: 2024-11-09 | Stop reason: HOSPADM

## 2024-11-08 SDOH — SOCIAL STABILITY: SOCIAL INSECURITY: HAS ANYONE EVER THREATENED TO HURT YOUR FAMILY OR YOUR PETS?: NO

## 2024-11-08 SDOH — SOCIAL STABILITY: SOCIAL INSECURITY: ARE YOU OR HAVE YOU BEEN THREATENED OR ABUSED PHYSICALLY, EMOTIONALLY, OR SEXUALLY BY ANYONE?: NO

## 2024-11-08 SDOH — SOCIAL STABILITY: SOCIAL INSECURITY: WERE YOU ABLE TO COMPLETE ALL THE BEHAVIORAL HEALTH SCREENINGS?: YES

## 2024-11-08 SDOH — SOCIAL STABILITY: SOCIAL INSECURITY: ABUSE: ADULT

## 2024-11-08 SDOH — SOCIAL STABILITY: SOCIAL INSECURITY: HAVE YOU HAD ANY THOUGHTS OF HARMING ANYONE ELSE?: NO

## 2024-11-08 ASSESSMENT — COGNITIVE AND FUNCTIONAL STATUS - GENERAL
MOBILITY SCORE: 24
DAILY ACTIVITIY SCORE: 24
MOBILITY SCORE: 24
DAILY ACTIVITIY SCORE: 24

## 2024-11-08 ASSESSMENT — ACTIVITIES OF DAILY LIVING (ADL)
LACK_OF_TRANSPORTATION: NO
LACK_OF_TRANSPORTATION: PATIENT DECLINED

## 2024-11-08 ASSESSMENT — PAIN - FUNCTIONAL ASSESSMENT: PAIN_FUNCTIONAL_ASSESSMENT: 0-10

## 2024-11-08 ASSESSMENT — PAIN SCALES - GENERAL
PAINLEVEL_OUTOF10: 0 - NO PAIN
PAINLEVEL_OUTOF10: 0 - NO PAIN

## 2024-11-08 NOTE — CARE PLAN
The patient's goals for the shift include     Problem: Respiratory  Goal: Minimal/no exertional discomfort or dyspnea this shift  Outcome: Progressing  Flowsheets (Taken 11/8/2024 1142)  Minimal/no exertional discomfort or dyspnea this shift: Positioning to promote ventilation/comfort     Problem: Fall/Injury  Goal: Not fall by end of shift  Outcome: Progressing     The clinical goals for the shift include pt will remain hemodynamically stable throughout shift

## 2024-11-08 NOTE — CONSULTS
"Nutrition Initial Assessment:   Nutrition Assessment    Reason for Assessment: Admission nursing screening    Patient is a 39 y.o. male presenting with community acquired pneumonia of left lower lobe of lung.       Nutrition History:  Energy Intake: Fair 50-75 %  Food and Nutrient History: Pt reports decreased appetite and PO intake x 1 week. States appetite is improving now, is eating better. Denies N/V/D/C and issues chewing/swallowing.  Vitamin/Herbal Supplement Use: none listed in home med list  Food Allergies/Intolerances:  None  GI Symptoms: None  Oral Problems: None       Anthropometrics:  Height: 175.3 cm (5' 9\")   Weight: 124 kg (273 lb)   BMI (Calculated): 40.3  IBW/kg (Dietitian Calculated): 72.7 kg          Weight History:   Wt Readings from Last 10 Encounters:   11/07/24 124 kg (273 lb)   11/05/24 125 kg (275 lb)   05/06/24 134 kg (296 lb 6.4 oz)   03/06/24 139 kg (305 lb 9.6 oz)   02/06/24 135 kg (297 lb 6.4 oz)   02/01/24 132 kg (290 lb)   02/01/24 133 kg (293 lb 12.8 oz)      Weight Change %:  Weight History / % Weight Change: Pt reports 10-20# wt loss x 1 week due to not eating, sweating a lot. Likely partly poor PO and partly fluid losses. States wt was 291# prior to wt loss. Based on available wt records, pt with 10 kg (7.4%) wt loss x 6 months.  Significant Weight Loss: No    Nutrition Focused Physical Exam Findings:  defer: not indicated  Subcutaneous Fat Loss:   Orbital Fat Pads: Defer  Muscle Wasting:  Temporalis: Defer  Edema:  Edema: none  Physical Findings:  Skin: Negative    Nutrition Significant Labs:    Reviewed   Nutrition Specific Medications:  Reviewed     I/O:   Last BM Date: 11/07/24;      Dietary Orders (From admission, onward)       Start     Ordered    11/08/24 0110  May Participate in Room Service  ( ROOM SERVICE MAY PARTICIPATE)  Once        Question:  .  Answer:  Yes    11/08/24 0109    11/07/24 1300  Adult diet Regular  Diet effective now        Question:  Diet type  " Answer:  Regular    11/07/24 1259                     Estimated Needs:   Total Energy Estimated Needs (kCal): 1817 kCal  Method for Estimating Needs: 25 kcal/kg IBW  Total Protein Estimated Needs (g): 58 g  Method for Estimating Needs: 0.8 g/kg IBW  Total Fluid Estimated Needs (mL): 1817 mL  Method for Estimating Needs: 1 ml/kcal or per MD        Nutrition Diagnosis   Malnutrition Diagnosis  Patient has Malnutrition Diagnosis:  (not suspected)    Nutrition Diagnosis  Patient has Nutrition Diagnosis: Yes  Diagnosis Status (1): New  Nutrition Diagnosis 1: Unintended weight loss  Related to (1): acute illness and decreased PO intake  As Evidenced by (1): 7.4% wt loss within 6 months based on available wt records, within 1 week per pt report       Nutrition Interventions/Recommendations         Nutrition Prescription:  Individualized Nutrition Prescription Provided for : Regular diet        Nutrition Interventions:   Interventions: Meals and snacks  Meals and Snacks: General healthful diet  Goal: Consumes 3 meals per day  Additional Interventions: ONS if intake is <75% of meals         Nutrition Education:   Discussed use of ONS if PO decreases again       Nutrition Monitoring and Evaluation   Food/Nutrient Related History Monitoring  Monitoring and Evaluation Plan: Energy intake, Amount of food, Fluid intake  Energy Intake: Estimated energy intake  Criteria: Pt meets >75% of estimated energy needs  Fluid Intake: Estimated fluid intake  Criteria: Meets >75% of estimated fluid needs  Amount of Food: Estimated amout of food  Criteria: Pt consumes >75% of meals    Body Composition/Growth/Weight History  Monitoring and Evaluation Plan: Weight  Weight: Measured weight  Criteria: Maintains stable weight                   Time Spent (min): 30 minutes

## 2024-11-08 NOTE — PROGRESS NOTES
Occupational Therapy                 Therapy Communication Note    Patient Name: Rolando Trotter  MRN: [id number]  Department: [facility]  Room: 603/603-A  Today's Date: 11/8/2024     Discipline: Occupational Therapy    Missed Visit Reason: Missed Visit Reason:  (No needs, pt is indep with functional mobility and Upper/LE adl per pt and nursing report)    Missed Time: Cancel    Comment:

## 2024-11-08 NOTE — PROGRESS NOTES
Physical Therapy                 Therapy Communication Note    Patient Name: Rolando Trotter  MRN: [id number]  Department: [facility]  Room: [address] [place] [address]  Today's Date: 11/8/2024     Discipline: Physical Therapy    Missed Visit Reason: Missed Visit Reason:  (Introduced self and role to patient. Pt states he is independently walking around room/hallway with no device and has returned to baseline LOF. No acute PT needs.)    Missed Time: Attempt

## 2024-11-08 NOTE — PROGRESS NOTES
11/08/24 1151   Discharge Planning   Living Arrangements Alone  (does have 5 yo dtr)   Support Systems Parent;Family members;Friends/neighbors   Assistance Needed Independent and driving PTA.  No assistive devices   Type of Residence Private residence   Do you have animals or pets at home? Yes   Type of Animals or Pets cat   Home or Post Acute Services None   Expected Discharge Disposition Home   Financial Resource Strain   How hard is it for you to pay for the very basics like food, housing, medical care, and heating? Not hard   Housing Stability   In the last 12 months, was there a time when you were not able to pay the mortgage or rent on time? N   In the past 12 months, how many times have you moved where you were living? 0   At any time in the past 12 months, were you homeless or living in a shelter (including now)? N   Transportation Needs   In the past 12 months, has lack of transportation kept you from medical appointments or from getting medications? no   In the past 12 months, has lack of transportation kept you from meetings, work, or from getting things needed for daily living? No     This TCC met with patient at bedside, introduced self and explained role.  Demographic information and insurance verified.  Patient is from home alone, independent and driving PTA.  Patient has 5 yo daughter.  Denies SW needs at this time.  Patient plans to return home at discharge, no needs.  Family is planned to provide transportation home.  Care Transitions will continue to follow.

## 2024-11-08 NOTE — PROGRESS NOTES
Rolando Trotter is a 39 y.o. male on day 1 of admission presenting with Community acquired pneumonia of left lower lobe of lung.      Subjective   Patient seen and examined by me.  Patient is resting in bed and states he is feeling better.  Patient remains febrile and Tmax was 38 1 today.  He is tachycardic.  Patient remains on IV Zithromax and IV Rocephin day #2 for his left lung pneumonia.  Patient appears in no acute distress but pulse ox is fluctuating from 88 to 93% on room air and will be given 2 L of nasal cannula oxygen.  Patient has had no nausea or vomiting.  Vitals and labs noted.  Blood pressure is borderline at 152/92.       Objective     Last Recorded Vitals  BP (!) 152/92   Pulse 103   Temp 37.1 °C (98.8 °F)   Resp 19   Wt 124 kg (273 lb)   SpO2 91%   Intake/Output last 3 Shifts:    Intake/Output Summary (Last 24 hours) at 11/8/2024 1836  Last data filed at 11/8/2024 1407  Gross per 24 hour   Intake 2488.34 ml   Output --   Net 2488.34 ml       Admission Weight  Weight: 124 kg (273 lb) (11/07/24 0816)    Daily Weight  11/07/24 : 124 kg (273 lb)    Image Results  ECG 12 lead  Sinus tachycardia  Probable left atrial enlargement  Borderline left axis deviation  ST elev, probable normal early repol pattern      Physical Exam  Vitals and nursing note reviewed.   Constitutional:       General: He is not in acute distress.     Appearance: Normal appearance. He is normal weight. He is not ill-appearing or toxic-appearing.   HENT:      Head: Normocephalic and atraumatic.      Right Ear: Tympanic membrane and ear canal normal. There is no impacted cerumen.      Left Ear: Tympanic membrane, ear canal and external ear normal.      Nose: Nose normal. No congestion or rhinorrhea.      Mouth/Throat:      Pharynx: Oropharynx is clear. No oropharyngeal exudate or posterior oropharyngeal erythema.   Eyes:      General: No scleral icterus.        Right eye: No discharge.         Left eye: No discharge.       Extraocular Movements: Extraocular movements intact.      Conjunctiva/sclera: Conjunctivae normal.      Pupils: Pupils are equal, round, and reactive to light.   Neck:      Vascular: No carotid bruit.   Cardiovascular:      Rate and Rhythm: Regular rhythm. Tachycardia present.      Pulses: Normal pulses.      Heart sounds: Normal heart sounds, S1 normal and S2 normal. No murmur heard.     No gallop.   Pulmonary:      Effort: Pulmonary effort is normal. No respiratory distress.      Breath sounds: No stridor or decreased air movement. Decreased breath sounds and rhonchi present. No wheezing or rales.      Comments: Left lung rhonchi/  Abdominal:      General: Abdomen is flat. Bowel sounds are normal. There is no distension.      Palpations: Abdomen is soft. There is no mass.      Tenderness: There is no abdominal tenderness. There is no right CVA tenderness, left CVA tenderness, guarding or rebound.      Hernia: No hernia is present.   Musculoskeletal:         General: No swelling or tenderness. Normal range of motion.      Cervical back: Normal range of motion and neck supple. No rigidity.      Right lower leg: No edema.      Left lower leg: No edema.   Lymphadenopathy:      Cervical: No cervical adenopathy.   Skin:     General: Skin is warm.      Coloration: Skin is not jaundiced.      Findings: No erythema or rash.   Neurological:      General: No focal deficit present.      Mental Status: He is alert and oriented to person, place, and time. Mental status is at baseline.      Sensory: No sensory deficit.      Motor: No weakness.      Gait: Gait normal.      Deep Tendon Reflexes: Reflexes normal.   Psychiatric:         Mood and Affect: Mood normal.         Thought Content: Thought content normal.         Judgment: Judgment normal.         Relevant Results    Current Facility-Administered Medications:     acetaminophen (Tylenol) tablet 650 mg, 650 mg, oral, q4h PRN **OR** acetaminophen (Tylenol) oral liquid 650 mg,  650 mg, oral, q4h PRN **OR** acetaminophen (Tylenol) suppository 650 mg, 650 mg, rectal, q4h PRN, Cole Ramirez PA-C    albuterol 2.5 mg /3 mL (0.083 %) nebulizer solution 2.5 mg, 2.5 mg, nebulization, q2h PRN, James Aguayo MD    azithromycin 500 mg in dextrose 5% 250 mL IV, 500 mg, intravenous, q24h, Cole Ramirez PA-C, Stopped at 11/08/24 1407    benzonatate (Tessalon) capsule 200 mg, 200 mg, oral, TID PRN, Cole Ramirez PA-C    cefTRIAXone (Rocephin) 2 g in dextrose (iso) IV 50 mL, 2 g, intravenous, q24h, Cole Ramirez PA-C, Stopped at 11/08/24 1306    enoxaparin (Lovenox) syringe 40 mg, 40 mg, subcutaneous, q12h TUAN, Cole Ramirez PA-C, 40 mg at 11/08/24 2027    ipratropium-albuteroL (Duo-Neb) 0.5-2.5 mg/3 mL nebulizer solution 3 mL, 3 mL, nebulization, q4h PRN, Cole Ramirez PA-C    methylPREDNISolone sod succinate (SOLU-Medrol) 40 mg/mL injection 40 mg, 40 mg, intravenous, q8h, Cole Ramirez PA-C, 40 mg at 11/08/24 2027    ondansetron (Zofran) tablet 4 mg, 4 mg, oral, q6h PRN **OR** ondansetron (Zofran) injection 4 mg, 4 mg, intravenous, q6h PRN, Coel Ramirez PA-C    oxygen (O2) therapy, , inhalation, Continuous PRN - O2/gases, Cole Ramirez PA-C     Results for orders placed or performed during the hospital encounter of 11/07/24 (from the past 24 hours)   Blood Culture    Specimen: Peripheral Venipuncture; Blood culture   Result Value Ref Range    Blood Culture Loaded on Instrument - Culture in progress    Blood Culture    Specimen: Peripheral Venipuncture; Blood culture   Result Value Ref Range    Blood Culture Loaded on Instrument - Culture in progress    CBC   Result Value Ref Range    WBC 6.1 4.4 - 11.3 x10*3/uL    nRBC 0.0 0.0 - 0.0 /100 WBCs    RBC 5.30 4.50 - 5.90 x10*6/uL    Hemoglobin 15.5 13.5 - 17.5 g/dL    Hematocrit 45.1 41.0 - 52.0 %    MCV 85 80 - 100 fL    MCH 29.2 26.0 - 34.0 pg    MCHC 34.4 32.0 - 36.0 g/dL    RDW 12.7 11.5 - 14.5 %    Platelets 172 150  - 450 x10*3/uL   Basic metabolic panel   Result Value Ref Range    Glucose 129 (H) 74 - 99 mg/dL    Sodium 130 (L) 136 - 145 mmol/L    Potassium 3.8 3.5 - 5.3 mmol/L    Chloride 100 98 - 107 mmol/L    Bicarbonate 23 21 - 32 mmol/L    Anion Gap 11 10 - 20 mmol/L    Urea Nitrogen 19 6 - 23 mg/dL    Creatinine 0.82 0.50 - 1.30 mg/dL    eGFR >90 >60 mL/min/1.73m*2    Calcium 8.1 (L) 8.6 - 10.3 mg/dL           Assessment/Plan                  Assessment & Plan  Community acquired pneumonia of left lower lobe of lung    Community acquired bacterial pneumonia  #1/Fever/productive cough/left lung community-acquired pneumonia with hypoxia and tachycardia.  Wheezing has improved and patient remains on IV Solu-Medrol.  Patient remains on day #2 of IV Zithromax and Rocephin.  Blood cultures are still in progress.  WBC count is normal.  Patient is clinically feeling better.  Pt has pulse ox dropping into the high 80s and will be given 2 L of nasal cannula oxygen.  Continue aerosol treatments and incentive spirometry./Patient remains on p.o. Tessalon Perles as needed for his cough.  Pulmonary consultation will be obtained.  Continue treatment as per orders.  2./Hyperglycemia most likely secondary to his IV steroids.  Hemoglobin A1c will be checked.  Patient has morbid obesity and will be advised on calorie/carbohydrate controlled diet and regular exercise and losing weight.  3./Borderline elevated blood pressure/continue to monitor and his IV fluids will be discontinued.  He is tolerating p.o. food.  4./DVT prophylaxis/increase ambulation as tolerated and remains on subcu Lovenox.  5./Hyponatremia sodium is 130 on admission and again today.  Most likely secondary to his pneumonia.  Repeat BMP will be checked in AM.  IV fluids will be discontinued.  Serum osmolality and urine osmolality will be checked.  Medications reconciled.  Labs reviewed.  Total time spent 30 minutes/time spent on patient interaction/counseling/assessment and  plan and documentation.                James Aguayo MD

## 2024-11-09 VITALS
RESPIRATION RATE: 19 BRPM | TEMPERATURE: 96.1 F | HEART RATE: 85 BPM | SYSTOLIC BLOOD PRESSURE: 132 MMHG | OXYGEN SATURATION: 92 % | WEIGHT: 273 LBS | DIASTOLIC BLOOD PRESSURE: 89 MMHG | BODY MASS INDEX: 40.43 KG/M2 | HEIGHT: 69 IN

## 2024-11-09 LAB
ANION GAP SERPL CALC-SCNC: 14 MMOL/L (ref 10–20)
BUN SERPL-MCNC: 23 MG/DL (ref 6–23)
CALCIUM SERPL-MCNC: 8.3 MG/DL (ref 8.6–10.3)
CHLORIDE SERPL-SCNC: 98 MMOL/L (ref 98–107)
CO2 SERPL-SCNC: 24 MMOL/L (ref 21–32)
CREAT SERPL-MCNC: 0.84 MG/DL (ref 0.5–1.3)
EGFRCR SERPLBLD CKD-EPI 2021: >90 ML/MIN/1.73M*2
GLUCOSE SERPL-MCNC: 132 MG/DL (ref 74–99)
HOLD SPECIMEN: NORMAL
LEGIONELLA AG UR QL: NEGATIVE
OSMOLALITY SERPL: 285 MOSM/KG (ref 280–300)
POTASSIUM SERPL-SCNC: 4.7 MMOL/L (ref 3.5–5.3)
S PNEUM AG UR QL: NEGATIVE
SODIUM SERPL-SCNC: 131 MMOL/L (ref 136–145)

## 2024-11-09 PROCEDURE — 2500000005 HC RX 250 GENERAL PHARMACY W/O HCPCS: Performed by: INTERNAL MEDICINE

## 2024-11-09 PROCEDURE — 36415 COLL VENOUS BLD VENIPUNCTURE: CPT | Performed by: INTERNAL MEDICINE

## 2024-11-09 PROCEDURE — 83036 HEMOGLOBIN GLYCOSYLATED A1C: CPT | Performed by: INTERNAL MEDICINE

## 2024-11-09 PROCEDURE — 80048 BASIC METABOLIC PNL TOTAL CA: CPT | Performed by: INTERNAL MEDICINE

## 2024-11-09 PROCEDURE — 83935 ASSAY OF URINE OSMOLALITY: CPT | Mod: PARLAB | Performed by: INTERNAL MEDICINE

## 2024-11-09 PROCEDURE — 83930 ASSAY OF BLOOD OSMOLALITY: CPT | Mod: PARLAB | Performed by: INTERNAL MEDICINE

## 2024-11-09 PROCEDURE — 2500000004 HC RX 250 GENERAL PHARMACY W/ HCPCS (ALT 636 FOR OP/ED): Performed by: PHYSICIAN ASSISTANT

## 2024-11-09 RX ORDER — PREDNISONE 10 MG/1
10 TABLET ORAL DAILY
Status: DISCONTINUED | OUTPATIENT
Start: 2024-11-10 | End: 2024-11-09 | Stop reason: HOSPADM

## 2024-11-09 RX ORDER — LEVOFLOXACIN 750 MG/1
750 TABLET ORAL EVERY 24 HOURS
Qty: 5 TABLET | Refills: 0 | Status: SHIPPED | OUTPATIENT
Start: 2024-11-09 | End: 2024-11-14

## 2024-11-09 RX ORDER — ALBUTEROL SULFATE 90 UG/1
2 INHALANT RESPIRATORY (INHALATION) EVERY 4 HOURS PRN
Qty: 6.7 G | Refills: 0 | Status: SHIPPED | OUTPATIENT
Start: 2024-11-09

## 2024-11-09 RX ORDER — PREDNISONE 10 MG/1
10 TABLET ORAL DAILY
Qty: 5 TABLET | Refills: 0 | Status: SHIPPED | OUTPATIENT
Start: 2024-11-10 | End: 2024-11-15

## 2024-11-09 ASSESSMENT — COGNITIVE AND FUNCTIONAL STATUS - GENERAL
MOBILITY SCORE: 24
DAILY ACTIVITIY SCORE: 24

## 2024-11-09 ASSESSMENT — PAIN - FUNCTIONAL ASSESSMENT: PAIN_FUNCTIONAL_ASSESSMENT: 0-10

## 2024-11-09 ASSESSMENT — PAIN SCALES - GENERAL: PAINLEVEL_OUTOF10: 0 - NO PAIN

## 2024-11-09 NOTE — PROGRESS NOTES
Rolando Trotter is a 39 y.o. male on day 2 of admission presenting with Community acquired pneumonia of left lower lobe of lung.      Subjective   Patient seen and examined by me.  Patient is feeling much better and is anxious to go home.  His cough is improving and he remains on room air.  Patient has been seen by pulmonary team.  They have recommending continuing p.o. antibiotics for 3 to 5 days more.  Clinically patient is doing much better.  He is afebrile.  His heart rate is in the mid 80s.  Labs and vitals noted.   Patient is ambulating in the hallway without any difficulty and has no shortness of breath.    Objective     Last Recorded Vitals  /89   Pulse 85   Temp 35.6 °C (96.1 °F)   Resp 19   Wt 124 kg (273 lb)   SpO2 92%   Intake/Output last 3 Shifts:    Intake/Output Summary (Last 24 hours) at 11/9/2024 1634  Last data filed at 11/9/2024 1233  Gross per 24 hour   Intake 1251 ml   Output --   Net 1251 ml       Admission Weight  Weight: 124 kg (273 lb) (11/07/24 0816)    Daily Weight  11/07/24 : 124 kg (273 lb)    Image Results  ECG 12 lead  Sinus tachycardia  Probable left atrial enlargement  Borderline left axis deviation  ST elev, probable normal early repol pattern      Physical Exam  Vitals and nursing note reviewed.   Constitutional:       General: He is not in acute distress.     Appearance: Normal appearance. He is morbidly obese. He is not ill-appearing or toxic-appearing.   HENT:      Head: Normocephalic and atraumatic.      Right Ear: Tympanic membrane and ear canal normal. There is no impacted cerumen.      Left Ear: Tympanic membrane, ear canal and external ear normal.      Nose: Nose normal. No congestion or rhinorrhea.      Mouth/Throat:      Pharynx: Oropharynx is clear. No oropharyngeal exudate or posterior oropharyngeal erythema.   Eyes:      General: No scleral icterus.        Right eye: No discharge.         Left eye: No discharge.      Extraocular Movements: Extraocular movements  intact.      Conjunctiva/sclera: Conjunctivae normal.      Pupils: Pupils are equal, round, and reactive to light.   Neck:      Vascular: No carotid bruit.   Cardiovascular:      Rate and Rhythm: Normal rate and regular rhythm.      Pulses: Normal pulses.      Heart sounds: Normal heart sounds. No murmur heard.     No gallop.   Pulmonary:      Effort: Pulmonary effort is normal. No tachypnea or respiratory distress.      Breath sounds: Normal breath sounds and air entry. No wheezing, rhonchi or rales.   Abdominal:      General: Abdomen is flat. Bowel sounds are normal. There is no distension.      Palpations: Abdomen is soft. There is no mass.      Tenderness: There is no abdominal tenderness. There is no right CVA tenderness, left CVA tenderness, guarding or rebound.      Hernia: No hernia is present.   Musculoskeletal:         General: No swelling or tenderness. Normal range of motion.      Cervical back: Normal range of motion and neck supple. No rigidity.      Right lower leg: No edema.      Left lower leg: No edema.   Lymphadenopathy:      Cervical: No cervical adenopathy.   Skin:     General: Skin is warm.      Coloration: Skin is not jaundiced.      Findings: No erythema or rash.   Neurological:      General: No focal deficit present.      Mental Status: He is alert and oriented to person, place, and time. Mental status is at baseline.      Sensory: No sensory deficit.      Motor: No weakness.      Gait: Gait normal.      Deep Tendon Reflexes: Reflexes normal.   Psychiatric:         Mood and Affect: Mood normal.         Thought Content: Thought content normal.         Judgment: Judgment normal.         Relevant Results  No current facility-administered medications for this encounter.    Current Outpatient Medications:     benzonatate (Tessalon Perles) 100 mg capsule, Take 2 capsules (200 mg) by mouth every 8 hours for 10 days. Do not crush or chew., Disp: 60 capsule, Rfl: 0    albuterol (Proventil HFA) 90  mcg/actuation inhaler, Inhale 2 puffs every 4 hours if needed for wheezing or shortness of breath., Disp: 6.7 g, Rfl: 0    levoFLOXacin (Levaquin) 750 mg tablet, Take 1 tablet (750 mg) by mouth once every 24 hours for 5 days., Disp: 5 tablet, Rfl: 0    predniSONE (Deltasone) 10 mg tablet, Take 1 tablet (10 mg) by mouth once daily for 5 doses. Do not fill before November 10, 2024., Disp: 5 tablet, Rfl: 0     Results for orders placed or performed during the hospital encounter of 11/07/24 (from the past 96 hours)   ECG 12 lead   Result Value Ref Range    Ventricular Rate 116 BPM    Atrial Rate 117 BPM    NH Interval 150 ms    QRS Duration 85 ms    QT Interval 305 ms    QTC Calculation(Bazett) 424 ms    P Axis 23 degrees    R Axis -21 degrees    T Axis 69 degrees    QRS Count 18 beats    Q Onset 249 ms    T Offset 402 ms    QTC Fredericia 380 ms   CBC and Auto Differential   Result Value Ref Range    WBC 9.6 4.4 - 11.3 x10*3/uL    nRBC 0.0 0.0 - 0.0 /100 WBCs    RBC 5.93 (H) 4.50 - 5.90 x10*6/uL    Hemoglobin 17.4 13.5 - 17.5 g/dL    Hematocrit 51.9 41.0 - 52.0 %    MCV 88 80 - 100 fL    MCH 29.3 26.0 - 34.0 pg    MCHC 33.5 32.0 - 36.0 g/dL    RDW 12.9 11.5 - 14.5 %    Platelets 178 150 - 450 x10*3/uL    Neutrophils % 80.7 40.0 - 80.0 %    Immature Granulocytes %, Automated 0.6 0.0 - 0.9 %    Lymphocytes % 12.7 13.0 - 44.0 %    Monocytes % 5.8 2.0 - 10.0 %    Eosinophils % 0.0 0.0 - 6.0 %    Basophils % 0.2 0.0 - 2.0 %    Neutrophils Absolute 7.74 (H) 1.20 - 7.70 x10*3/uL    Immature Granulocytes Absolute, Automated 0.06 0.00 - 0.70 x10*3/uL    Lymphocytes Absolute 1.22 1.20 - 4.80 x10*3/uL    Monocytes Absolute 0.56 0.10 - 1.00 x10*3/uL    Eosinophils Absolute 0.00 0.00 - 0.70 x10*3/uL    Basophils Absolute 0.02 0.00 - 0.10 x10*3/uL   Comprehensive Metabolic Panel   Result Value Ref Range    Glucose 117 (H) 74 - 99 mg/dL    Sodium 132 (L) 136 - 145 mmol/L    Potassium 3.7 3.5 - 5.3 mmol/L    Chloride 97 (L) 98 - 107  mmol/L    Bicarbonate 26 21 - 32 mmol/L    Anion Gap 13 10 - 20 mmol/L    Urea Nitrogen 25 (H) 6 - 23 mg/dL    Creatinine 1.28 0.50 - 1.30 mg/dL    eGFR 73 >60 mL/min/1.73m*2    Calcium 8.7 8.6 - 10.3 mg/dL    Albumin 3.9 3.4 - 5.0 g/dL    Alkaline Phosphatase 33 33 - 120 U/L    Total Protein 7.4 6.4 - 8.2 g/dL    AST 41 (H) 9 - 39 U/L    Bilirubin, Total 0.4 0.0 - 1.2 mg/dL    ALT 46 10 - 52 U/L   Troponin I, High Sensitivity   Result Value Ref Range    Troponin I, High Sensitivity 12 0 - 20 ng/L   Sars-CoV-2 PCR   Result Value Ref Range    Coronavirus 2019, PCR Not Detected Not Detected   Influenza A, and B PCR   Result Value Ref Range    Flu A Result Not Detected Not Detected    Flu B Result Not Detected Not Detected   Blood Culture    Specimen: Peripheral Venipuncture; Blood culture   Result Value Ref Range    Blood Culture No growth at 2 days    Blood Culture    Specimen: Peripheral Venipuncture; Blood culture   Result Value Ref Range    Blood Culture No growth at 2 days    Legionella Antigen, Urine    Specimen: Clean Catch/Voided; Urine   Result Value Ref Range    L. pneumophila Urine Ag Negative Negative   Streptococcus pneumoniae Antigen, Urine    Specimen: Clean Catch/Voided; Urine   Result Value Ref Range    Streptococcus pneumoniae Ag, Urine Negative Negative   CBC   Result Value Ref Range    WBC 6.1 4.4 - 11.3 x10*3/uL    nRBC 0.0 0.0 - 0.0 /100 WBCs    RBC 5.30 4.50 - 5.90 x10*6/uL    Hemoglobin 15.5 13.5 - 17.5 g/dL    Hematocrit 45.1 41.0 - 52.0 %    MCV 85 80 - 100 fL    MCH 29.2 26.0 - 34.0 pg    MCHC 34.4 32.0 - 36.0 g/dL    RDW 12.7 11.5 - 14.5 %    Platelets 172 150 - 450 x10*3/uL   Basic metabolic panel   Result Value Ref Range    Glucose 129 (H) 74 - 99 mg/dL    Sodium 130 (L) 136 - 145 mmol/L    Potassium 3.8 3.5 - 5.3 mmol/L    Chloride 100 98 - 107 mmol/L    Bicarbonate 23 21 - 32 mmol/L    Anion Gap 11 10 - 20 mmol/L    Urea Nitrogen 19 6 - 23 mg/dL    Creatinine 0.82 0.50 - 1.30 mg/dL     eGFR >90 >60 mL/min/1.73m*2    Calcium 8.1 (L) 8.6 - 10.3 mg/dL   Lavender Top   Result Value Ref Range    Extra Tube Hold for add-ons.    Osmolality   Result Value Ref Range    Osmolality, Serum 285 280 - 300 mOsm/kg   Basic Metabolic Panel   Result Value Ref Range    Glucose 132 (H) 74 - 99 mg/dL    Sodium 131 (L) 136 - 145 mmol/L    Potassium 4.7 3.5 - 5.3 mmol/L    Chloride 98 98 - 107 mmol/L    Bicarbonate 24 21 - 32 mmol/L    Anion Gap 14 10 - 20 mmol/L    Urea Nitrogen 23 6 - 23 mg/dL    Creatinine 0.84 0.50 - 1.30 mg/dL    eGFR >90 >60 mL/min/1.73m*2    Calcium 8.3 (L) 8.6 - 10.3 mg/dL   Hemoglobin A1C   Result Value Ref Range    Hemoglobin A1C 5.7 (H) See comment %    Estimated Average Glucose 117 Not Established mg/dL   Osmolality, urine   Result Value Ref Range    Osmolality, Urine Random 804 200 - 1,200 mOsm/kg           Assessment/Plan                  Assessment & Plan  Community acquired pneumonia of left lower lobe of lung    Community acquired bacterial pneumonia  #1/Fever/productive cough/left lung community-acquired pneumonia with hypoxia and tachycardia/and wheezing on admission/cough and tachycardia and hypoxia has resolved.  Patient is on room air.  He is afebrile.  WBC count is normal and blood cultures are negative.  His COVID-19 and flu tests were negative on admission.  Patient will be switched to p.o. Levaquin 750 mg p.o. daily for 5 days.  He will be given prednisone 10 mg p.o. daily for 5 days.  He has been seen by pulmonary team and cleared for discharge.  Patient will be discharged home in stable condition.  2./Hyperglycemia most likely secondary to his IV steroids.  Hemoglobin A1c will be checked.  Patient has morbid obesity and will be advised on calorie/carbohydrate controlled diet and regular exercise and losing weight.  3./Hx of hypertension/patient is currently not on any antihypertensives for many months.  He has been advised on no added salt diet and losing weight.  Patient  needs a repeat blood pressure check at the office in 2 to 3 weeks and has been advised to see his PCP or follow-up with me in the office in 1 to 2 weeks.  6./Hyponatremia sodium [id number] on admission and again today.  Most likely secondary to his pneumonia.  Repeat sodium has improved to 132.  Patient is clinically improved bank and will be discharged home and is stable to be discharged.  Discharge summary done.                  James Aguayo MD

## 2024-11-09 NOTE — DISCHARGE INSTRUCTIONS
Please do a BMP /CBC with differential week and hand written requisition given./For follow-up of hyponatremia and pneumonia  Follow-up with his PCP in 1 to 2 weeks.  Patient states that he may be switching primary care physicians and please give him my number also and he can decide to follow-up with his PCP or me in 1 to 2 weeks for follow-up of his pneumonia.  Patient also needs to have his blood pressure checked as he stated that he has not been taking his antihypertensive medications for many months as advised.

## 2024-11-09 NOTE — DISCHARGE SUMMARY
Discharge Diagnosis  Community acquired pneumonia of left lower lobe of lung  #1/Shortness of breath with tachycardia and tachypnea and acute hypoxia secondary to community-acquired left lung pneumonia.  CTA of the chest done in ED showed left lung consolidation and infiltrate/no acute pulmonary emboli seen/patient treated with IV Rocephin and Zithromax and will be switched to Levaquin 750 mg p.o. daily for 5 more days.  Patient also was wheezing on admission and was given IV Solu-Medrol and will be switched to p.o. prednisone 10 mg p.o. daily for 5 days.  Patient also advised to continue his Tessalon Perles as needed.  Patient will also be given albuterol 2 puffs every 8 hours as needed.  He is not a smoker.  Patient seen by pulmonary team and their consultation appreciated.  Clinically patient is doing much better and his shortness of breath and chest discomfort and fever and tachypnea and tachycardia have resolved.  Patient will be discharged home in stable condition to have follow-up in 1 to 2 weeks with PCP.  2./Mild hyponatremia on admission sodium today is 132.  This is most likely secondary to his pneumonia.  Check repeat BMP in 1 week.  3./History of hypertension patient states that he has not been taking his antihypertensive medications and advised on no added salt diet and regular exercise and losing weight.  Check repeat blood pressure in the office setting and consider antihypertensive medications if blood pressure higher than 140/90.  Patient advised on losing weight.  4//Morbid obesity/patient advised on calorie/carbohydrate controlled diet and losing weight.  As per his records he was taking Wegovy many months ago and has not been using it currently.  This needs to be discussed and weight loss programs and medication and treatment needs to be discussed in the outpatient.  Medications reconciled.  Issues Requiring Follow-Up  #1 follow-up of pneumonia  2./Continued monitoring of his blood pressure as  he has history of hypertension and not taking his antihypertensive medications on admission.  3./Weight loss program and morbid obesity.    Discharge Meds     Medication List      START taking these medications     albuterol 90 mcg/actuation inhaler; Commonly known as: Proventil HFA;   Inhale 2 puffs every 4 hours if needed for wheezing or shortness of   breath.   levoFLOXacin 750 mg tablet; Commonly known as: Levaquin; Take 1 tablet   (750 mg) by mouth once every 24 hours for 5 days.   predniSONE 10 mg tablet; Commonly known as: Deltasone; Take 1 tablet (10   mg) by mouth once daily for 5 doses. Do not fill before November 10,   2024.; Start taking on: November 10, 2024     CONTINUE taking these medications     benzonatate 100 mg capsule; Commonly known as: Tessalon Perles; Take 2   capsules (200 mg) by mouth every 8 hours for 10 days. Do not crush or   chew.     STOP taking these medications     amLODIPine 10 mg tablet; Commonly known as: Norvasc   doxycycline 100 mg capsule; Commonly known as: Vibramycin   lisinopril 20 mg tablet   omeprazole 20 mg DR capsule; Commonly known as: PriLOSEC   ondansetron 4 mg tablet; Commonly known as: Zofran   Wegovy 0.25 mg/0.5 mL pen injector; Generic drug: semaglutide (weight   loss)       Test Results Pending At Discharge  Pending Labs       Order Current Status    Hemoglobin A1C In process    Osmolality In process    Osmolality, urine In process    Blood Culture Preliminary result    Blood Culture Preliminary result            Hospital Course   This is a 39 years old pleasant male with morbid obesity and BMI is 40/history of hypertension and states that he has not been taking his antihypertensive medications for many months who presented to the ED with chief complaint of shortness of breath and productive cough of about 1 week duration.  Patient had been feeling not well and was feeling sick and had fever chills and bodyaches and generalized weakness over the past 1 week.   Patient did give history of coughing greenish to brownish bloody mucousy phlegm.  Patient presented to the ED when he was having difficulty with breathing deep and had chest discomfort.  Patient had gone to an urgent care and was taking doxycycline for about 2 days or so and his symptoms were getting worse.  Patient was evaluated in the ED and was found to be tachycardic hypoxic and tachypneic and sodium was borderline low at 132.  WBC count was 9.6.  Chest x-ray showed bibasilar densities consistent with atelectasis/small areas of pneumonia not excluded.  Given his hypoxia patient had a CTA of the chest and acute PE was ruled out.  Patient had a left lower lobe consolidation and infiltrate consistent with pneumonia.  Patient was given supplemental oxygen and was wheezing and was started on IV Solu-Medrol and was started on IV Zithromax and Rocephin and aerosol treatments and Tessalon Perles for his symptoms.  His blood cultures came back negative.  Patient was negative for flu test and COVID-19 test.  His fever improved.  His tachycardia removal improved.  Blood pressure remained in the range of 130s to 85-89 diastolic.  Patient was advised on calorie/carbohydrate controlled diet and no added salt diet and have a repeat blood pressure check in the office in 1 to 2 weeks to further decide if patient should resume his antihypertensive medications which she stopped several months ago patient advised on no added diet and weight and regular exercise 30 minutes 5 times a week.  Patient has been afebrile and cough and cough is much better.  Shortness of breath and chest discomfort has resolved.  Patient has been seen by pulmonary team Dr. AKERS and he has recommended that patient be discharged home on p.o. Levaquin and will be discharged home on 750 mg of p.o. Levaquin daily for 5 more days and prednisone 10 mg p.o. daily for 5 more days.  Sodium has improved to 132.  Check repeat BMP and CBC with differential in 1  week.  Patient advised to have a follow-up with his PCP in 1 week.  Patient states that he is not sure if he wants to keep his current PCP but will think about it.  He was advised that if he decides to switch his PCP he can call my office and follow-up in 1 to 2 weeks.  Patient will be discharged home in stable condition.  Medications reconciled.  Total time spent 45 minutes/time spent on patient interaction counseling assessment and plan and documentation.  Discharge Meds reconciled and time also spent on coordination of discharge.  Case discussed with the nursing on the floor.    Pertinent Physical Exam At Time of Discharge  Physical Exam  Vitals and nursing note reviewed.   Constitutional:       General: He is awake. He is not in acute distress.     Appearance: Normal appearance. He is well-developed. He is morbidly obese. He is not ill-appearing or toxic-appearing.   HENT:      Head: Normocephalic and atraumatic.      Right Ear: Hearing, tympanic membrane and ear canal normal. There is no impacted cerumen.      Left Ear: Hearing, tympanic membrane, ear canal and external ear normal.      Nose: Nose normal. No congestion or rhinorrhea.      Mouth/Throat:      Pharynx: Oropharynx is clear. No oropharyngeal exudate or posterior oropharyngeal erythema.   Eyes:      General: No visual field deficit or scleral icterus.        Right eye: No discharge.         Left eye: No discharge.      Extraocular Movements: Extraocular movements intact.      Conjunctiva/sclera: Conjunctivae normal.      Pupils: Pupils are equal, round, and reactive to light.   Neck:      Vascular: No carotid bruit.   Cardiovascular:      Rate and Rhythm: Normal rate and regular rhythm.      Chest Wall: PMI is not displaced. No thrill.      Pulses: Normal pulses.      Heart sounds: Normal heart sounds, S1 normal and S2 normal. No murmur heard.     No gallop.   Pulmonary:      Effort: Pulmonary effort is normal. No tachypnea, bradypnea, accessory  muscle usage or respiratory distress.      Breath sounds: Normal breath sounds and air entry. No wheezing, rhonchi or rales.   Abdominal:      General: Abdomen is flat. Bowel sounds are normal. There is no distension.      Palpations: Abdomen is soft. There is no mass.      Tenderness: There is no abdominal tenderness. There is no right CVA tenderness, left CVA tenderness, guarding or rebound.      Hernia: No hernia is present.   Musculoskeletal:         General: No swelling or tenderness. Normal range of motion.      Cervical back: Normal range of motion and neck supple. No rigidity.      Right lower leg: No edema.      Left lower leg: No edema.   Lymphadenopathy:      Cervical: No cervical adenopathy.   Skin:     General: Skin is warm.      Coloration: Skin is not jaundiced.      Findings: No erythema or rash.   Neurological:      General: No focal deficit present.      Mental Status: He is alert and oriented to person, place, and time. Mental status is at baseline.      Cranial Nerves: Cranial nerves 2-12 are intact. No cranial nerve deficit, dysarthria or facial asymmetry.      Sensory: Sensation is intact. No sensory deficit.      Motor: Motor function is intact. No weakness.      Gait: Gait normal.      Deep Tendon Reflexes: Reflexes are normal and symmetric. Reflexes normal.   Psychiatric:         Attention and Perception: Attention and perception normal.         Mood and Affect: Mood and affect normal.         Behavior: Behavior is cooperative.         Thought Content: Thought content normal.         Judgment: Judgment normal.         Outpatient Follow-Up  No future appointments.      James Aguayo MD

## 2024-11-09 NOTE — ASSESSMENT & PLAN NOTE
#1/Fever/productive cough/left lung community-acquired pneumonia with hypoxia and tachycardia/and wheezing on admission/cough and tachycardia and hypoxia has resolved.  Patient is on room air.  He is afebrile.  WBC count is normal and blood cultures are negative.  His COVID-19 and flu tests were negative on admission.  Patient will be switched to p.o. Levaquin 750 mg p.o. daily for 5 days.  He will be given prednisone 10 mg p.o. daily for 5 days.  He has been seen by pulmonary team and cleared for discharge.  Patient will be discharged home in stable condition.  2./Hyperglycemia most likely secondary to his IV steroids.  Hemoglobin A1c will be checked.  Patient has morbid obesity and will be advised on calorie/carbohydrate controlled diet and regular exercise and losing weight.  3./Hx of hypertension/patient is currently not on any antihypertensives for many months.  He has been advised on no added salt diet and losing weight.  Patient needs a repeat blood pressure check at the office in 2 to 3 weeks and has been advised to see his PCP or follow-up with me in the office in 1 to 2 weeks.  6./Hyponatremia sodium [id number] on admission and again today.  Most likely secondary to his pneumonia.  Repeat sodium has improved to 132.  Patient is clinically improved bank and will be discharged home and is stable to be discharged.  Discharge summary done.

## 2024-11-09 NOTE — CONSULTS
"Reason For Consult  Chief complaint-shortness of breath and chest tightness    History Of Present Illness  Rolando Trotter is a 39 y.o. male presenting with complaints of shortness of breath and chest tightness.  His symptoms started approximately 1 week ago.  Initially had some tightness on the left side of the chest posteriorly and also dyspnea.  Patient did not feel well.  His dyspnea became progressively worse.  Also he started coughing up yellow phlegm.  Patient went to local urgent care center and was given prescription for doxycycline.  Despite that his symptoms did not improve and finally he decided to go to emergency room.  Patient was admitted for further management and started on IV antibiotics.  Today he feels significantly better.  His dyspnea has resolved and he has been able to ambulate without any difficulties.  Patient denies any history of lung problems such as asthma, TB or recurrent pneumonia.  No recent travel and no recent exposure to sick contacts.     Past Medical History  He has no past medical history on file.    Surgical History  He has no past surgical history on file.     Social History  He reports that he has never smoked. His smokeless tobacco use includes chew. He reports current alcohol use. He reports that he does not use drugs.    Family History  No family history on file.     Allergies  Patient has no known allergies.    Review of Systems  10 system review of systems performed and negative for any complaints outside of the ones mentioned in history of present illness     Physical Exam  Head and face no deformities  Oropharynx normal mucosa  Neck is supple no thyromegaly  Chest is symmetric no crackles  Heart is regular no murmurs  Abdomen is soft and nontender  Skin is intact  Joints are normal  Neurologically patient is moving all 4 limbs.     Last Recorded Vitals  Blood pressure 133/84, pulse 89, temperature 35.5 °C (95.9 °F), resp. rate 19, height 1.753 m (5' 9\"), weight 124 kg " (273 lb), SpO2 93%.          Assessment/Plan     Patient presenting with 6 days of left-sided chest tightness cough with phlegm production and dyspnea.  On arrival he had elevated white blood cell count.  CAT scan of the chest demonstrated area of consolidation in the left lower lobe.  His symptoms are consistent with diagnosis of community-acquired pneumonia.  His condition has improved already.  White blood cell count has normalized.  Blood cultures are negative so far.    Plan:  From pulmonary perspective patient is doing much better and antibiotics can be de-escalated.  Patient can be converted to oral antibiotics such as levofloxacin and continue with 3 more days of levofloxacin as an outpatient.  From pulmonary perspective patient can be discharged.      Nicolas Smallwood MD

## 2024-11-09 NOTE — NURSING NOTE
Discharge instruction provide to patient. Discharge paper and order for lab  handed to patient also. Lab is to be drawn in 1 week per physician. Patient verbalized understanding. Patient is arranging own transportation. Vital signs  stable.

## 2024-11-09 NOTE — ASSESSMENT & PLAN NOTE
#1/Fever/productive cough/left lung community-acquired pneumonia with hypoxia and tachycardia.  Wheezing has improved and patient remains on IV Solu-Medrol.  Patient remains on day #2 of IV Zithromax and Rocephin.  Blood cultures are still in progress.  WBC count is normal.  Patient is clinically feeling better.  Pt has pulse ox dropping into the high 80s and will be given 2 L of nasal cannula oxygen.  Continue aerosol treatments and incentive spirometry./Patient remains on p.o. Tessalon Perles as needed for his cough.  Pulmonary consultation will be obtained.  Continue treatment as per orders.  2./Hyperglycemia most likely secondary to his IV steroids.  Hemoglobin A1c will be checked.  Patient has morbid obesity and will be advised on calorie/carbohydrate controlled diet and regular exercise and losing weight.  3./Borderline elevated blood pressure/continue to monitor and his IV fluids will be discontinued.  He is tolerating p.o. food.  4./DVT prophylaxis/increase ambulation as tolerated and remains on subcu Lovenox.  5./Hyponatremia sodium is 130 on admission and again today.  Most likely secondary to his pneumonia.  Repeat BMP will be checked in AM.  IV fluids will be discontinued.  Serum osmolality and urine osmolality will be checked.  Medications reconciled.  Labs reviewed.  Total time spent 30 minutes/time spent on patient interaction/counseling/assessment and plan and documentation.

## 2024-11-10 LAB
BACTERIA BLD CULT: NORMAL
BACTERIA BLD CULT: NORMAL
EST. AVERAGE GLUCOSE BLD GHB EST-MCNC: 117 MG/DL
HBA1C MFR BLD: 5.7 %
OSMOLALITY UR: 804 MOSM/KG (ref 200–1200)

## 2024-11-11 ENCOUNTER — PATIENT OUTREACH (OUTPATIENT)
Dept: PRIMARY CARE | Facility: CLINIC | Age: 39
End: 2024-11-11
Payer: COMMERCIAL

## 2024-11-11 NOTE — PROGRESS NOTES
Discharge Facility: [facility]  Discharge Diagnosis: Community acquired pneumonia of left lower lobe of lung  Admission Date: 11/7/2024  Discharge Date: 11/9/2024    PCP Appointment Date:  -Pt declines scheduling at this time stating he does not have his calendar. Pt encouraged to call to schedule.    Hospital Encounter and Summary Linked: Yes    See discharge assessment below for further details    Engagement  Call Start Time: 1259 (11/11/2024 12:59 PM)    Medications  Medications reviewed with patient/caregiver?: Yes (new prescriptions reviewed; albuterol, levofloxacin, prednisone) (11/11/2024 12:59 PM)  Is the patient having any side effects they believe may be caused by any medication additions or changes?: No (11/11/2024 12:59 PM)  Does the patient have all medications ordered at discharge?: Yes (11/11/2024 12:59 PM)  Care Management Interventions: No intervention needed (11/11/2024 12:59 PM)  Is the patient taking all medications as directed (includes completed medication regime)?: Yes (11/11/2024 12:59 PM)    Appointments  Does the patient have a primary care provider?: Yes (11/11/2024 12:59 PM)  Care Management Interventions: Advised patient to make appointment (11/11/2024 12:59 PM)  Has the patient kept scheduled appointments due by today?: Yes (11/11/2024 12:59 PM)    Self Management  Has home health visited the patient within 72 hours of discharge?: Not applicable (11/11/2024 12:59 PM)    Patient Teaching  Does the patient have access to their discharge instructions?: Yes (11/11/2024 12:59 PM)  Care Management Interventions: Reviewed instructions with patient (11/11/2024 12:59 PM)  What is the patient's perception of their health status since discharge?: Improving (11/11/2024 12:59 PM)  Is the patient/caregiver able to teach back the hierarchy of who to call/visit for symptoms/problems? PCP, Specialist, Home Health nurse, Urgent Care, ED, 911: Yes (11/11/2024 12:59 PM)    Wrap Up  Wrap Up  Additional Comments: Pt was admitted to [facility] 11/7-11/9/2024 for community acquired pneumonia of left lower lobe of lung. Pt reports feeling better since discharge. Pt discharged with prescriptions for albuterol, levofloxacin, and prednisone; pt denies questions or issues with medication. Pt reports understanding of discharge instructions. Pt aware of repeat lab work orders and confirms he was given lab req paperwork. Pt denies any questions, needs, or concerns. Pt declines scheduling PCP appt at this time. Pt encouraged to call to schedule. Pt encouraged to call if questions or needs arise. (11/11/2024 12:59 PM)  Call End Time: 1301 (11/11/2024 12:59 PM)

## 2024-11-12 LAB
BACTERIA BLD CULT: NORMAL
BACTERIA BLD CULT: NORMAL

## 2024-11-15 LAB
ATRIAL RATE: 117 BPM
P AXIS: 23 DEGREES
PR INTERVAL: 150 MS
Q ONSET: 249 MS
QRS COUNT: 18 BEATS
QRS DURATION: 85 MS
QT INTERVAL: 305 MS
QTC CALCULATION(BAZETT): 424 MS
QTC FREDERICIA: 380 MS
R AXIS: -21 DEGREES
T AXIS: 69 DEGREES
T OFFSET: 402 MS
VENTRICULAR RATE: 116 BPM

## 2024-11-25 ENCOUNTER — PATIENT OUTREACH (OUTPATIENT)
Dept: PRIMARY CARE | Facility: CLINIC | Age: 39
End: 2024-11-25
Payer: COMMERCIAL

## 2024-11-25 NOTE — PROGRESS NOTES
Post hospital discharge follow up call complete.  At time of outreach call the patient feels as if their condition has improved since last visit.  Pt denies any current needs from PCP.     Pt denies any questions, needs, or concerns. He is encouraged to call if questions or needs arise.

## 2024-12-27 ENCOUNTER — PATIENT OUTREACH (OUTPATIENT)
Dept: PRIMARY CARE | Facility: CLINIC | Age: 39
End: 2024-12-27
Payer: COMMERCIAL

## 2024-12-27 NOTE — PROGRESS NOTES
Unable to reach patient for discharge follow up call.   Left voicemail with call back number for patient to call if needed   If no voicemail available call attempts x 2 were made to contact the patient to assist with any questions or concerns patient may have.

## 2025-01-24 ENCOUNTER — PATIENT OUTREACH (OUTPATIENT)
Dept: PRIMARY CARE | Facility: CLINIC | Age: 40
End: 2025-01-24
Payer: COMMERCIAL

## 2025-01-24 NOTE — PROGRESS NOTES
90 day post hospital discharge follow up call complete. Pt reports everything is back to normal. Pt denies any current needs from Dr. Woods.  Pt denies any questions, needs, or concerns at this time. Pt encouraged to call if questions or needs arise.